# Patient Record
Sex: MALE | Race: WHITE | Employment: FULL TIME | URBAN - METROPOLITAN AREA
[De-identification: names, ages, dates, MRNs, and addresses within clinical notes are randomized per-mention and may not be internally consistent; named-entity substitution may affect disease eponyms.]

---

## 2017-10-05 ENCOUNTER — OFFICE VISIT (OUTPATIENT)
Dept: ORTHOPEDIC SURGERY | Age: 42
End: 2017-10-05

## 2017-10-05 VITALS
SYSTOLIC BLOOD PRESSURE: 106 MMHG | HEART RATE: 72 BPM | DIASTOLIC BLOOD PRESSURE: 73 MMHG | WEIGHT: 220 LBS | BODY MASS INDEX: 33.34 KG/M2 | HEIGHT: 68 IN

## 2017-10-05 DIAGNOSIS — S86.111A GASTROCNEMIUS STRAIN, RIGHT, INITIAL ENCOUNTER: ICD-10-CM

## 2017-10-05 DIAGNOSIS — R52 PAIN: Primary | ICD-10-CM

## 2017-10-05 PROCEDURE — 99203 OFFICE O/P NEW LOW 30 MIN: CPT | Performed by: ORTHOPAEDIC SURGERY

## 2017-10-05 PROCEDURE — 73590 X-RAY EXAM OF LOWER LEG: CPT | Performed by: ORTHOPAEDIC SURGERY

## 2017-10-05 NOTE — MR AVS SNAPSHOT
Learn more at: [email or web address]          Instructions    Patient was provided with instructions regarding their diagnosis and treatment today. They voiced understanding of the treatment plan and were instructed on when to return to the clinic. Medications and Orders      Allergies           No Known Allergies      We Ordered/Performed the following           XR TIBIA FIBULA RIGHT (2 VIEWS)     Comments:    [id number]         Result Summary for XR TIBIA FIBULA RIGHT (2 VIEWS)      Result Information     Status          Final result (Exam End: 10/5/2017  9:08 AM)           10/5/2017  9:08 AM      Narrative & Impression           Radiology result is complete; follow up with provider / physician office for radiology results                       Additional Information        Basic Information     Date Of Birth Sex Race Preferred Language       1975 Male Unavailable English       Problem List as of 10/5/2017  Date Reviewed: 10/5/2017          None      Preventive Care        Date Due    HIV screening is recommended for all people regardless of risk factors  aged 15-65 years at least once (lifetime) who have never been HIV tested. 11/15/1988    Tetanus Combination Vaccine (1 - Tdap) 11/15/1992    Cholesterol Screening 11/15/2013    Diabetes Screening 11/15/2013    Yearly Flu Vaccine (1) 9/1/2017            [employer]OIt Signup           [employer] allows you to send messages to your doctor, view your test results, renew your prescriptions, schedule appointments, view visit notes, and more. How Do I Sign Up? 1. In your Internet browser, go to [email or web address]  2. Click on the Sign Up Now link in the Sign In box. You will see the New Member Sign Up page. 3. Enter your [employer] Access Code exactly as it appears below. You will not need to use this code after youve completed the sign-up process.  If you do not sign up before the expiration date, you must request a new code. [employer] Access Code: [email or web address]  Expires: 12/4/2017  9:30 AM    4. Enter your Social Security Number ([id number]) and Date of Birth (mm/dd/yyyy) as indicated and click Submit. You will be taken to the next sign-up page. 5. Create a [employer] ID. This will be your [employer] login ID and cannot be changed, so think of one that is secure and easy to remember. 6. Create a [employer] password. You can change your password at any time. 7. Enter your Password Reset Question and Answer. This can be used at a later time if you forget your password. 8. Enter your e-mail address. You will receive e-mail notification when new information is available in [address] 9. Click Sign Up. You can now view your medical record. Additional Information  If you have questions, please contact the physician practice where you receive care. Remember, [employer] is NOT to be used for urgent needs. For medical emergencies, dial 911. For questions regarding your [employer] account call [phone]. If you have a clinical question, please call your doctor's office.

## 2017-10-05 NOTE — PATIENT INSTRUCTIONS
Patient was provided with instructions regarding their diagnosis and treatment today. They voiced understanding of the treatment plan and were instructed on when to return to the clinic.

## 2017-10-05 NOTE — PROGRESS NOTES
CHIEF COMPLAINT:    Chief Complaint   Patient presents with    Leg Pain     right calf pain. PT states was trying to jump up wall on tuesday felt a pop in calf. HISTORY OF PRESENT ILLNESS:                The patient is a 35 y.o. male who presents to clinic for evaluation of right calf pain. He states he felt a pop in this When pushing off of his foot. He was at his sides martial arts class when he decides to run up a wall without stretching. He has since had pain with ambulating. His wife had a Lachman. He has very of this which has helped somewhat. He points to the posterior aspect of the right lower leg as the source of his pain. No past medical history on file. The pain assessment was noted & is as follows:  Pain Assessment  Location of Pain: Leg  Location Modifiers: Right  Severity of Pain: 7  Quality of Pain: Aching, Sharp  Duration of Pain: Persistent  Frequency of Pain: Intermittent  Aggravating Factors: Bending, Stretching, Walking  Limiting Behavior: Some  Relieving Factors: Rest, Ice  Result of Injury: Yes  Work-Related Injury: No  Are there other pain locations you wish to document?: No]      Work Status/Functionality:     Past Medical History: Medical history form was reviewed today & can be found in the media tab  No past medical history on file. Past Surgical History:     No past surgical history on file. Current Medications:   No current outpatient prescriptions on file. Allergies:  Review of patient's allergies indicates no known allergies. Social History:    reports that he has never smoked. He does not have any smokeless tobacco history on file. Family History:   No family history on file. REVIEW OF SYSTEMS:   For new problems, a full review of systems will be found scanned in the patient's chart.   CONSTITUTIONAL: Denies unexplained weight loss, fevers, chills   NEUROLOGICAL: Denies unsteady gait or progressive weakness  SKIN: Denies skin changes, delayed healing, rash, itching       PHYSICAL EXAM:    Vitals: Blood pressure 106/73, pulse 72, height 5' 8\" (1.727 m), weight 220 lb (99.8 kg). GENERAL EXAM:  · General Apparence: Patient is adequately groomed with no evidence of malnutrition. · Orientation: The patient is oriented to time, place and person. · Mood & Affect:The patient's mood and affect are appropriate       Right calf PHYSICAL EXAMINATION:  · Inspection:  Slight swelling is present but there is no ecchymosis or erythema at this time    · Palpation:  He is tender along the midline of the lower leg posteriorly at the area of the plantaris and medial gastroc. There is no significant tenderness of the distal Achilles tendon    · Range of Motion: He can dorsiflex to neutral today but with discomfort. Plantarflexion is tolerated    · Strength: Weakness with resisted plantar flexion secondary to pain    · Special Tests: Negative Quesada test compared to the contralateral side            · Skin:  There are no rashes, ulcerations or lesions. · Gait & station: Antalgic favoring the right lower extremity      · Additional Examinations:        Left Lower Extremity: Examination of the left lower extremity does not show any tenderness, deformity or injury. Range of motion is unremarkable. There is no gross instability. There are no rashes, ulcerations or lesions. Strength and tone are normal.      Diagnostic Testing:      Views:  2   Location:  Right tib-fib   Findings:  No acute bony abnormalities of the tib-fib. He does have significant ossification of the proximal tibia at the tibial tubercle    Orders     Orders Placed This Encounter   Procedures    XR TIBIA FIBULA RIGHT (2 VIEWS)     [id number]     Order Specific Question:   Reason for exam:     Answer:   Pain         Assessment / Treatment Plan:     1. Medial gastroc/plantaris strain    The patient is going to continue to utilize his wife's walking boot.   I recommended follow-up in 2-3 weeks for repeat

## 2019-06-24 ENCOUNTER — OFFICE VISIT (OUTPATIENT)
Dept: ORTHOPEDIC SURGERY | Age: 44
End: 2019-06-24
Payer: COMMERCIAL

## 2019-06-24 VITALS — BODY MASS INDEX: 33.04 KG/M2 | HEIGHT: 68 IN | WEIGHT: 218 LBS

## 2019-06-24 DIAGNOSIS — M16.12 PRIMARY OSTEOARTHRITIS OF LEFT HIP: ICD-10-CM

## 2019-06-24 DIAGNOSIS — M25.552 LEFT HIP PAIN: Primary | ICD-10-CM

## 2019-06-24 PROCEDURE — 99214 OFFICE O/P EST MOD 30 MIN: CPT | Performed by: ORTHOPAEDIC SURGERY

## 2019-06-24 RX ORDER — MELOXICAM 15 MG/1
15 TABLET ORAL DAILY
Qty: 90 TABLET | Refills: 1 | Status: SHIPPED | OUTPATIENT
Start: 2019-06-24 | End: 2020-05-18

## 2019-06-24 NOTE — PROGRESS NOTES
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 HISTORY OF PRESENT ILLNESS:                The patient is a 37 y.o. male who presents to clinic for evaluation of left hip pain. He states he started noticing pain in this hip about a year ago which was significantly worsened last week when bowling. He denies any distinct injury however, did aggravate it while playing tennis as well. He works as a  and an . History reviewed. No pertinent past medical history. The pain assessment was noted & is as follows:  Pain Assessment  Location of Pain: Pelvis  Location Modifiers: Left  Severity of Pain: 5  Quality of Pain: Aching  Duration of Pain: Persistent  Frequency of Pain: Constant]      Work Status/Functionality:     Past Medical History: Medical history form was reviewed today & can be found in the media tab  History reviewed. No pertinent past medical history. Past Surgical History:     History reviewed. No pertinent surgical history. Current Medications:   No current outpatient medications on file. Allergies:  Patient has no known allergies. Social History:    reports that he has never smoked. He has never used smokeless tobacco.  Family History:   History reviewed. No pertinent family history. REVIEW OF SYSTEMS:   For new problems, a full review of systems will be found scanned in the patient's chart. CONSTITUTIONAL: Denies unexplained weight loss, fevers, chills   NEUROLOGICAL: Denies unsteady gait or progressive weakness  SKIN: Denies skin changes, delayed healing, rash, itching       PHYSICAL EXAM:    Vitals: Height 5' 7.99\" (1.727 m), weight 218 lb (98.9 kg). GENERAL EXAM:  · General Apparence: Patient is adequately groomed with no evidence of malnutrition. · Orientation: The patient is oriented to time, place and person. · Mood & Affect: The patient's mood and affect are appropriate       Left hip PHYSICAL EXAMINATION:  · Inspection: No visible deformity. No significant edema, erythema or ecchymosis. · Palpation: Tenderness to palpation of the groin without palpable mass    · Range of Motion: Range of motion of the hip is limited with internal and external rotation    · Strength: No gross strength deficits are noted    · Special Tests: No pain with gentle logroll testing. Negative Homans testing. · Skin:  There are no rashes, ulcerations or lesions. · There are no dysvascular changes     Gait & station: Normal gait today      Additional Examinations:        Right Lower Extremity: Examination of the right lower extremity does not show any tenderness, deformity or injury. Range of motion is unremarkable. There is no gross instability. There are no rashes, ulcerations or lesions. Strength and tone are normal.      Diagnostic Testing: The following x rays were read and interpreted by myself      1.  2 x-rays of the left hip were taken today and reveal significant osteoarthritis with cam type impingement, significant joint space narrowing and osteophyte formation    Orders     Orders Placed This Encounter   Procedures    XR HIP LEFT (2-3 VIEWS)     Standing Status:   Future     Number of Occurrences:   1     Standing Expiration Date:   7/24/2019         Assessment / Treatment Plan:     1. Left hip osteoarthritis, severe    We had a lengthy discussion regarding the severity of his osteoarthritis and the chronic nature of this diagnosis. We discussed activity modification including low impact activities such as biking and swimming. We also discussed the progressive nature of his injury and likely joint replacement in the future. At this time, we are going to prescribe him meloxicam to help with his daily pain and stiffness. He will return to the clinic for future treatment as his symptoms progress.      I spent 25 minutes face-to-face with the patient and greater than 50% of that time was spent counseling/coordinating care for the above-stated diagnosis    I have personally performed and/or participated in the history, exam and medical decision making and agree with all pertinent clinical information. I have also reviewed and agree with the past medical, family and social history unless otherwise noted. This dictation was performed with a verbal recognition program (DRAGON) and it was checked for errors. It is possible that there are still dictated errors within this office note. If so, please bring any errors to my attention for an addendum. All efforts were made to ensure that this office note is accurate.           Alejandra Jiang MD

## 2019-06-24 NOTE — PATIENT INSTRUCTIONS
Patient Education        Hip Arthritis: Exercises  Your Care Instructions  Here are some examples of exercises for hip arthritis. Start each exercise slowly. Ease off the exercise if you start to have pain. Your doctor or physical therapist will tell you when you can start these exercises and which ones will work best for you. How to do the exercises  Straight-leg raises to the outside    1. Lie on your side, with your affected hip on top. 2. Tighten the front thigh muscles of your top leg to keep your knee straight. 3. Keep your hip and your leg straight in line with the rest of your body, and keep your knee pointing forward. Do not drop your hip back. 4. Lift your top leg straight up toward the ceiling, about 12 inches off the floor. Hold for about 6 seconds, then slowly lower your leg. 5. Repeat 8 to 12 times. 6. Switch legs and repeat steps 1 through 5, even if only one hip is sore. Straight-leg raises to the inside    1. Lie on your side with your affected hip on the floor. 2. You can either prop up your other leg on a chair, or you can bend that knee and put that foot in front of your other knee. Do not drop your hip back. 3. Tighten the muscles on the front thigh of your bottom leg to straighten that knee. 4. Keep your kneecap pointing forward and your leg straight, and lift your bottom leg up toward the ceiling about 6 inches. Hold for about 6 seconds, then lower slowly. 5. Repeat 8 to 12 times. 6. Switch legs and repeat steps 1 through 5, even if only one hip is sore. Hip hike    1. Stand sideways on the bottom step of a staircase, and hold on to the banister or wall. 2. Keeping both knees straight, lift your good leg off the step and let it hang down. Then hike your good hip up to the same level as your affected hip or a little higher. 3. Repeat 8 to 12 times. 4. Switch legs and repeat steps 1 through 3, even if only one hip is sore. Bridging    1.  Lie on your back with both knees stretch for 15 to 30 seconds. 5. Repeat 2 to 4 times. 6. Repeat steps 1 through 5, but this time use your hand to gently pull your knee toward your opposite shoulder. 7. Switch legs and repeat steps 1 through 6, even if only one hip is sore. Knee-to-chest    1. Lie on your back with your knees bent and your feet flat on the floor. 2. Bring your affected leg to your chest, keeping the other foot flat on the floor (or keeping the other leg straight, whichever feels better on your lower back). 3. Keep your lower back pressed to the floor. Hold for at least 15 to 30 seconds. 4. Relax, and lower the knee to the starting position. 5. Repeat 2 to 4 times. 6. Switch legs and repeat steps 1 through 5, even if only one hip is sore. 7. To get more stretch, put your other leg flat on the floor while pulling your knee to your chest.    Clamshell    1. Lie on your side, with your affected hip on top. Keep your feet and knees together and your knees bent. 2. Raise your top knee, but keep your feet together. Do not let your hips roll back. Your legs should open up like a clamshell. 3. Hold for 6 seconds. 4. Slowly lower your knee back down. Rest for 10 seconds. 5. Repeat 8 to 12 times. 6. Switch legs and repeat steps 1 through 5, even if only one hip is sore. Follow-up care is a key part of your treatment and safety. Be sure to make and go to all appointments, and call your doctor if you are having problems. It's also a good idea to know your test results and keep a list of the medicines you take. Where can you learn more? Go to [email or web address] and sign in to your [employer] account. Enter G814 in the Codota box to learn more about \"Hip Arthritis: Exercises. \"     If you do not have an account, please click on the \"Sign Up Now\" link. Current as of: September 20, 2018  Content Version: 12.0  © 7394-8033 Healthwise, Incorporated.  Care instructions adapted under license by

## 2020-05-18 RX ORDER — MELOXICAM 15 MG/1
TABLET ORAL
Qty: 30 TABLET | Refills: 0 | Status: SHIPPED | OUTPATIENT
Start: 2020-05-18 | End: 2020-07-06 | Stop reason: SDUPTHER

## 2020-07-06 RX ORDER — MELOXICAM 15 MG/1
15 TABLET ORAL DAILY
Qty: 90 TABLET | Refills: 2 | Status: ON HOLD | OUTPATIENT
Start: 2020-07-06 | End: 2020-10-20 | Stop reason: HOSPADM

## 2020-08-13 ENCOUNTER — OFFICE VISIT (OUTPATIENT)
Dept: ORTHOPEDIC SURGERY | Age: 45
End: 2020-08-13
Payer: COMMERCIAL

## 2020-08-13 VITALS — WEIGHT: 218 LBS | HEIGHT: 68 IN | BODY MASS INDEX: 33.04 KG/M2

## 2020-08-13 PROCEDURE — 99214 OFFICE O/P EST MOD 30 MIN: CPT | Performed by: PHYSICIAN ASSISTANT

## 2020-08-13 NOTE — PROGRESS NOTES
CHIEF COMPLAINT:    Chief Complaint   Patient presents with    Hip Pain     CK LEFT HIP       HISTORY OF PRESENT ILLNESS:                The patient is a 53 y.o. male returns to clinic today with continued left hip pain. He is an established patient with known, severe osteoarthritis of the left hip. He is very active and coaches tennis. He is very active in cycling as well. He states that his hip pain is continuing to worsen and is significantly limiting his quality of life. He struggles with very basic activities such as getting in and out of the car or sleeping at night. No past medical history on file. Work Status: He is a     The pain assessment was noted & is as follows:  Pain Assessment  Location of Pain: Pelvis  Location Modifiers: Left  Severity of Pain: 5  Quality of Pain: Dull, Aching, Sharp  Duration of Pain: A few hours  Frequency of Pain: Several times daily  Aggravating Factors: Stretching, Bending, Walking, Stairs  Limiting Behavior: Some  Relieving Factors: Rest  Result of Injury: No  Work-Related Injury: No  Are there other pain locations you wish to document?: No]      Work Status/Functionality:     Past Medical History: Medical history form was reviewed today & can be found in the media tab  No past medical history on file. Past Surgical History:     No past surgical history on file. Current Medications:     Current Outpatient Medications:     meloxicam (MOBIC) 15 MG tablet, Take 1 tablet by mouth daily, Disp: 90 tablet, Rfl: 2  Allergies:  Patient has no known allergies. Social History:    reports that he has never smoked. He has never used smokeless tobacco.  Family History:   No family history on file. REVIEW OF SYSTEMS:   For new problems, a full review of systems will be found scanned in the patient's chart.   CONSTITUTIONAL: Denies unexplained weight loss, fevers, chills   NEUROLOGICAL: Denies unsteady gait or progressive weakness  SKIN: Denies skin changes, delayed healing, rash, itching       PHYSICAL EXAM:    Vitals: Height 5' 7.99\" (1.727 m), weight 218 lb (98.9 kg). GENERAL EXAM:  · General Apparence: Patient is adequately groomed with no evidence of malnutrition. · Orientation: The patient is oriented to time, place and person. · Mood & Affect:The patient's mood and affect are appropriate       Left hip PHYSICAL EXAMINATION:  · Inspection: No visible deformity. No significant edema, erythema or ecchymosis. · Palpation: Tenderness to palpation of the groin without palpable mass    · Range of Motion: External rotation is limited to approximately 45 degrees. Internal rotation is virtually nonexistent. · Strength: Significant strength deficits are noted    · Special Tests: Neurovascular exam is intact the distal extremity. Negative Homans testing. · Skin:  There are no rashes, ulcerations or lesions. · There are no dysvascular changes     Gait & station: Mildly antalgic gait      Additional Examinations:        Right Lower Extremity: Examination of the right lower extremity does not show any tenderness, deformity or injury. Range of motion is unremarkable. There is no gross instability. There are no rashes, ulcerations or lesions. Strength and tone are normal.      Diagnostic Testing: The following x rays were read and interpreted by myself      1.  2 x-rays of the left hip were taken today and reveal absolutely end-stage bone-on-bone osteoarthritis with significant osteophyte formation and cam type impingement    Orders     Orders Placed This Encounter   Procedures    XR HIP LEFT (2-3 VIEWS)     Standing Status:   Future     Number of Occurrences:   1     Standing Expiration Date:   8/13/2021     Order Specific Question:   Reason for exam:     Answer:   PAIN         Assessment / Treatment Plan:     1.   Left hip osteoarthritis, severe    We discussed the severity of his osteoarthritis and given his very active lifestyle in limitations he is having, he is interested in moving forward with total hip replacement. Perform a left total hip replacement using [employer] system. We will utilize Oxinium cross-link polyethylene. We discussed the risk, benefits and complications of total hip replacement arthroplasty surgery. We specifically discussed concerns including infection, deep vein thrombosis, neurological injury, and specifically sciatic nerve injury with the possibility of footdrop or other neurological compromise. We further discussed the possibility of dislocation of the prosthesis and the precautions that are involved after total hip replacement surgery to try to avoid dislocation. We also discussed the reality of the rehabilitation process. We discussed the rehabilitation involved with total hip replacement surgery, possible inpatient rehabilitation facility placement versus going home with home physical therapy. We also talked about visiting with [address] [place] [address] postoperative physical therapy to regain range of motion and strength after the surgery. All questions were answered. The appropriate literature was given to the patient. I spent 25 minutes face-to-face with the patient and greater than 50% of that time was spent counseling/coordinating care for the above-stated diagnosis       Franca Aquino, [facility]    This dictation was performed with a verbal recognition program (DRAGON) and it was checked for errors. It is possible that there are still dictated errors within this office note. If so, please bring any errors to my attention for an addendum. All efforts were made to ensure that this office note is accurate.

## 2020-09-04 ENCOUNTER — TELEPHONE (OUTPATIENT)
Dept: ORTHOPEDIC SURGERY | Age: 45
End: 2020-09-04

## 2020-09-04 NOTE — TELEPHONE ENCOUNTER
Auth: NPR  Date: 10/20/20  Spoke with: NKECHI  Type of SX: OUTPATIENT  Location: [facility]  CPT [id number]   SX area:  HIP  Insurance: [email or web address]

## 2020-10-01 ENCOUNTER — TELEPHONE (OUTPATIENT)
Dept: ORTHOPEDIC SURGERY | Age: 45
End: 2020-10-01

## 2020-10-01 NOTE — TELEPHONE ENCOUNTER
COVID:10/16/2020- Negative     Orthopedic Nurse Navigator Summary  -  Patient Name: Jill Celestin  Anticipated Date of Surgery:10/20/2020  Using [employer]? Yes, Are they Registered: Yes  Attended Pre-Op Education Class: No  If No, why not? PCP:  Phone #:  Date of PCP Visit for H&P: 10/09/2020  Any Noted Concerns from PCP prior to surgery: No  If Yes, what concerns?:  Is the Patient in a Pain Management Program?: No  Review of Past Medical History Reveals History of:  -  Critical Lab Values:  - Hemoglobin (g/dL): Date Value  - Hematocrit (%): Date Value  - HgbA1C : Date Value  - Albumin : Date Value  - BUN (mg/dL) : Date Value  - CREATININE (mg/dL) : Date Value  - BMI (kg/m2) : Date Value  -  Coronary Artery Disease/HTN/CHF History: No  Cardiologist:  Cardiac Clearance Necessary: No  Date of Cardiac Clearance Appt: On Plavix? No  If YES, when will they stop taking? Final Cardiac Recommendations: On any anticoagulation: No  -  Diabetes History: No  Most Recent HgbA1C:  PCP or Endocrine Recommendations:  Nutritionist/Dietician Consult Scheduled:  Final Plan For Diabetic Control:  Pulmonary: COPD/Emphysema/ Use of home oxygen: NONE  Alcohol use: Non-Drinker  -  DVT Risk Stratification: Low Risk  Vascular Consult Ordered:  Date of Vascular Appt:  Hematology/Oncology Consult Ordered:  Date of Hematology/Oncology Appt:  Final Recommendation For DVT Prophylaxis:  Smoking history: Non-Smoker  Use of Estrogen:  -  BMI Greater than 40 at time of scheduling?: No  Has Surgeon been notified of BMI concern? No  Weight Loss Clinic Consult Ordered No  Date of Wt Loss Clinic Appt:  BMI at time of surgery (if went through [facility]):  -  Additional Medical Concerns: Additional Recommendations for above concerns:  Attended Pre-Hab Program: No  Anticipated Discharge Disposition: D/C home  Who will be with patient at home following discharge?  wife  Equipment patient already has: Юлия Arenas on first or second floor: first  Bathroom on first or second floor: first  Weight bearing status: full  Pre-op ambulatory status:  Number of entry steps: FIVE  Caregiver assistance: full time  Tej Slade [facility]  10/06/2020

## 2020-10-09 ENCOUNTER — HOSPITAL ENCOUNTER (OUTPATIENT)
Dept: PREADMISSION TESTING | Age: 45
Discharge: HOME | End: 2020-10-13
Payer: COMMERCIAL

## 2020-10-09 LAB
ABO/RH: NORMAL
ALBUMIN SERPL-MCNC: 4.2 G/DL (ref 3.4–5)
ANION GAP SERPL CALCULATED.3IONS-SCNC: 10 MMOL/L (ref 3–16)
ANTIBODY SCREEN: NORMAL
APTT: 30.7 SEC (ref 24.2–36.2)
BASOPHILS ABSOLUTE: 0.1 K/UL (ref 0–0.2)
BASOPHILS RELATIVE PERCENT: 0.7 %
BILIRUBIN URINE: NEGATIVE
BLOOD, URINE: NEGATIVE
BUN BLDV-MCNC: 19 MG/DL (ref 7–20)
CALCIUM SERPL-MCNC: 9.7 MG/DL (ref 8.3–10.6)
CHLORIDE BLD-SCNC: 100 MMOL/L (ref 99–110)
CLARITY: CLEAR
CO2: 28 MMOL/L (ref 21–32)
COLOR: YELLOW
CREAT SERPL-MCNC: 1 MG/DL (ref 0.9–1.3)
EKG ATRIAL RATE: 65 BPM
EKG DIAGNOSIS: NORMAL
EKG P AXIS: 51 DEGREES
EKG P-R INTERVAL: 218 MS
EKG Q-T INTERVAL: 398 MS
EKG QRS DURATION: 88 MS
EKG QTC CALCULATION (BAZETT): 413 MS
EKG R AXIS: -13 DEGREES
EKG T AXIS: 6 DEGREES
EKG VENTRICULAR RATE: 65 BPM
EOSINOPHILS ABSOLUTE: 0.2 K/UL (ref 0–0.6)
EOSINOPHILS RELATIVE PERCENT: 1.9 %
GFR AFRICAN AMERICAN: >60
GFR NON-AFRICAN AMERICAN: >60
GLUCOSE BLD-MCNC: 81 MG/DL (ref 70–99)
GLUCOSE URINE: NEGATIVE MG/DL
HCT VFR BLD CALC: 45 % (ref 40.5–52.5)
HEMOGLOBIN: 15.1 G/DL (ref 13.5–17.5)
INR BLD: 0.94 (ref 0.86–1.14)
KETONES, URINE: NEGATIVE MG/DL
LEUKOCYTE ESTERASE, URINE: NEGATIVE
LYMPHOCYTES ABSOLUTE: 3.7 K/UL (ref 1–5.1)
LYMPHOCYTES RELATIVE PERCENT: 45.2 %
MCH RBC QN AUTO: 29.1 PG (ref 26–34)
MCHC RBC AUTO-ENTMCNC: 33.6 G/DL (ref 31–36)
MCV RBC AUTO: 86.6 FL (ref 80–100)
MICROSCOPIC EXAMINATION: NORMAL
MONOCYTES ABSOLUTE: 0.7 K/UL (ref 0–1.3)
MONOCYTES RELATIVE PERCENT: 8.2 %
NEUTROPHILS ABSOLUTE: 3.6 K/UL (ref 1.7–7.7)
NEUTROPHILS RELATIVE PERCENT: 44 %
NITRITE, URINE: NEGATIVE
PDW BLD-RTO: 12.9 % (ref 12.4–15.4)
PH UA: 7 (ref 5–8)
PLATELET # BLD: 288 K/UL (ref 135–450)
PMV BLD AUTO: 7.9 FL (ref 5–10.5)
POTASSIUM SERPL-SCNC: 4.1 MMOL/L (ref 3.5–5.1)
PROTEIN UA: NEGATIVE MG/DL
PROTHROMBIN TIME: 10.9 SEC (ref 10–13.2)
RBC # BLD: 5.2 M/UL (ref 4.2–5.9)
SODIUM BLD-SCNC: 138 MMOL/L (ref 136–145)
SPECIFIC GRAVITY UA: 1.02 (ref 1–1.03)
TRANSFERRIN: 268 MG/DL (ref 200–360)
URINE TYPE: NORMAL
UROBILINOGEN, URINE: 0.2 E.U./DL
WBC # BLD: 8.1 K/UL (ref 4–11)

## 2020-10-09 PROCEDURE — 86900 BLOOD TYPING SEROLOGIC ABO: CPT

## 2020-10-09 PROCEDURE — 82040 ASSAY OF SERUM ALBUMIN: CPT

## 2020-10-09 PROCEDURE — 86850 RBC ANTIBODY SCREEN: CPT

## 2020-10-09 PROCEDURE — 85025 COMPLETE CBC W/AUTO DIFF WBC: CPT

## 2020-10-09 PROCEDURE — 81003 URINALYSIS AUTO W/O SCOPE: CPT

## 2020-10-09 PROCEDURE — 93005 ELECTROCARDIOGRAM TRACING: CPT

## 2020-10-09 PROCEDURE — 85610 PROTHROMBIN TIME: CPT

## 2020-10-09 PROCEDURE — 86901 BLOOD TYPING SEROLOGIC RH(D): CPT

## 2020-10-09 PROCEDURE — 84466 ASSAY OF TRANSFERRIN: CPT

## 2020-10-09 PROCEDURE — 36415 COLL VENOUS BLD VENIPUNCTURE: CPT

## 2020-10-09 PROCEDURE — 87086 URINE CULTURE/COLONY COUNT: CPT

## 2020-10-09 PROCEDURE — 83036 HEMOGLOBIN GLYCOSYLATED A1C: CPT

## 2020-10-09 PROCEDURE — 80048 BASIC METABOLIC PNL TOTAL CA: CPT

## 2020-10-09 PROCEDURE — 85730 THROMBOPLASTIN TIME PARTIAL: CPT

## 2020-10-10 LAB
ESTIMATED AVERAGE GLUCOSE: 108.3 MG/DL
HBA1C MFR BLD: 5.4 %
URINE CULTURE, ROUTINE: NORMAL

## 2020-10-12 ENCOUNTER — ANESTHESIA EVENT (OUTPATIENT)
Dept: OPERATING ROOM | Age: 45
End: 2020-10-12
Payer: COMMERCIAL

## 2020-10-12 SDOH — HEALTH STABILITY: MENTAL HEALTH: HOW OFTEN DO YOU HAVE A DRINK CONTAINING ALCOHOL?: NEVER

## 2020-10-12 NOTE — PROGRESS NOTES
Obstructive Sleep Apnea (SHERIE) Screening     Patient:  Joyce Singh    YOB: 1975      Medical Record #:  [id number]                     Date:  10/12/2020     1. Are you a loud and/or regular snorer? [x]  Yes       [] No    2. Have you been observed to gasp or stop breathing during sleep? []  Yes       [x] No    3. Do you feel tired or groggy upon awakening or do you awaken with a headache?           []  Yes       [x] No    4. Are you often tired or fatigued during the wake time hours? []  Yes       [] No    5. Do you fall asleep sitting, reading, watching TV or driving? []  Yes       [] No    6. Do you often have problems with memory or concentration? []  Yes       [] No    **If patient's score is ? 3 they are considered high risk for SHERIE. An Anesthesia provider will evaluate the patient and develop a plan of care the day of surgery. Note:  If the patient's BMI is more than 35 kg m¯² , has neck circumference > 40 cm, and/or high blood pressure the risk is greater (© [employer], 2006).

## 2020-10-12 NOTE — PROGRESS NOTES
Preoperative Screening for Elective Surgery/Invasive Procedures While COVID-19 present in the community     Have you had any of the following symptoms? o Fever, chills  o Cough  o Shortness of breath  o Muscle aches/pain  o Diarrhea  o Abdominal pain, nausea, vomiting  o Loss or decrease in taste and / or smell   Risk of Exposure  o Have you recently been hospitalized for COVID-19 or flu-like illness, if so when?  o Recently diagnosed with COVID-19, if so when?  o Recently tested for COVID-19, if so when?  o Have you been in close contact with a person or family member who currently has or recently had COVID-19? If yes, when and in what context?  o Do you live with anybody who in the last 14 days has had fever, chills, shortness of breath, muscle aches, flu-like illness?  o Do you have any close contacts or family members who are currently in the hospital for COVID-19 or flu-like illness? If yes, assess recent close contact with this person. Indicate if the patient has a positive screen by answering yes to one or more of the above questions. Patients who test positive or screen positive prior to surgery or on the day of surgery should be evaluated in conjunction with the surgeon/proceduralist/anesthesiologist to determine the urgency of the procedure. No to all questions.  Covid test 10/16

## 2020-10-14 ENCOUNTER — TELEPHONE (OUTPATIENT)
Dept: ORTHOPEDIC SURGERY | Age: 45
End: 2020-10-14

## 2020-10-16 ENCOUNTER — OFFICE VISIT (OUTPATIENT)
Dept: PRIMARY CARE CLINIC | Age: 45
End: 2020-10-16
Payer: COMMERCIAL

## 2020-10-16 PROCEDURE — 99211 OFF/OP EST MAY X REQ PHY/QHP: CPT | Performed by: NURSE PRACTITIONER

## 2020-10-16 NOTE — PATIENT INSTRUCTIONS

## 2020-10-16 NOTE — PROGRESS NOTES
Mya Gabriel received a viral test for COVID-19. They were educated on isolation and quarantine as appropriate. For any symptoms, they were directed to seek care from their PCP, given contact information to establish with a doctor, directed to an urgent care or the emergency room.

## 2020-10-17 LAB — SARS-COV-2, NAA: NOT DETECTED

## 2020-10-19 NOTE — RESULT ENCOUNTER NOTE

## 2020-10-20 ENCOUNTER — HOSPITAL ENCOUNTER (OUTPATIENT)
Age: 45
Discharge: HOME | End: 2020-10-21
Attending: ORTHOPAEDIC SURGERY | Admitting: ORTHOPAEDIC SURGERY
Payer: COMMERCIAL

## 2020-10-20 ENCOUNTER — APPOINTMENT (OUTPATIENT)
Dept: GENERAL RADIOLOGY | Age: 45
End: 2020-10-20
Attending: ORTHOPAEDIC SURGERY
Payer: COMMERCIAL

## 2020-10-20 ENCOUNTER — ANESTHESIA (OUTPATIENT)
Dept: OPERATING ROOM | Age: 45
End: 2020-10-20
Payer: COMMERCIAL

## 2020-10-20 VITALS
RESPIRATION RATE: 6 BRPM | SYSTOLIC BLOOD PRESSURE: 91 MMHG | TEMPERATURE: 98.6 F | DIASTOLIC BLOOD PRESSURE: 53 MMHG | OXYGEN SATURATION: 100 %

## 2020-10-20 PROBLEM — Z96.642 STATUS POST TOTAL HIP REPLACEMENT, LEFT: Status: ACTIVE | Noted: 2020-10-20

## 2020-10-20 PROBLEM — Z96.642 STATUS POST TOTAL REPLACEMENT OF LEFT HIP: Status: ACTIVE | Noted: 2020-10-20

## 2020-10-20 LAB
ABO/RH: NORMAL
ANTIBODY SCREEN: NORMAL

## 2020-10-20 PROCEDURE — 3600000015 HC SURGERY LEVEL 5 ADDTL 15MIN: Performed by: ORTHOPAEDIC SURGERY

## 2020-10-20 PROCEDURE — 86850 RBC ANTIBODY SCREEN: CPT

## 2020-10-20 PROCEDURE — 2500000003 HC RX 250 WO HCPCS: Performed by: ANESTHESIOLOGY

## 2020-10-20 PROCEDURE — 97530 THERAPEUTIC ACTIVITIES: CPT

## 2020-10-20 PROCEDURE — 6360000002 HC RX W HCPCS: Performed by: PHYSICIAN ASSISTANT

## 2020-10-20 PROCEDURE — 97110 THERAPEUTIC EXERCISES: CPT

## 2020-10-20 PROCEDURE — 97165 OT EVAL LOW COMPLEX 30 MIN: CPT

## 2020-10-20 PROCEDURE — 6360000002 HC RX W HCPCS: Performed by: ANESTHESIOLOGY

## 2020-10-20 PROCEDURE — 2500000003 HC RX 250 WO HCPCS: Performed by: ORTHOPAEDIC SURGERY

## 2020-10-20 PROCEDURE — 7100000000 HC PACU RECOVERY - FIRST 15 MIN: Performed by: ORTHOPAEDIC SURGERY

## 2020-10-20 PROCEDURE — 88304 TISSUE EXAM BY PATHOLOGIST: CPT

## 2020-10-20 PROCEDURE — 2709999900 HC NON-CHARGEABLE SUPPLY: Performed by: ORTHOPAEDIC SURGERY

## 2020-10-20 PROCEDURE — C1713 ANCHOR/SCREW BN/BN,TIS/BN: HCPCS | Performed by: ORTHOPAEDIC SURGERY

## 2020-10-20 PROCEDURE — 3700000000 HC ANESTHESIA ATTENDED CARE: Performed by: ORTHOPAEDIC SURGERY

## 2020-10-20 PROCEDURE — 6370000000 HC RX 637 (ALT 250 FOR IP): Performed by: ANESTHESIOLOGY

## 2020-10-20 PROCEDURE — 2500000003 HC RX 250 WO HCPCS: Performed by: NURSE ANESTHETIST, CERTIFIED REGISTERED

## 2020-10-20 PROCEDURE — 2500000003 HC RX 250 WO HCPCS

## 2020-10-20 PROCEDURE — 6360000002 HC RX W HCPCS: Performed by: ORTHOPAEDIC SURGERY

## 2020-10-20 PROCEDURE — 76942 ECHO GUIDE FOR BIOPSY: CPT | Performed by: ANESTHESIOLOGY

## 2020-10-20 PROCEDURE — 97161 PT EVAL LOW COMPLEX 20 MIN: CPT

## 2020-10-20 PROCEDURE — 3600000005 HC SURGERY LEVEL 5 BASE: Performed by: ORTHOPAEDIC SURGERY

## 2020-10-20 PROCEDURE — 86901 BLOOD TYPING SEROLOGIC RH(D): CPT

## 2020-10-20 PROCEDURE — C1776 JOINT DEVICE (IMPLANTABLE): HCPCS | Performed by: ORTHOPAEDIC SURGERY

## 2020-10-20 PROCEDURE — 97116 GAIT TRAINING THERAPY: CPT

## 2020-10-20 PROCEDURE — 73501 X-RAY EXAM HIP UNI 1 VIEW: CPT

## 2020-10-20 PROCEDURE — 6360000002 HC RX W HCPCS: Performed by: NURSE ANESTHETIST, CERTIFIED REGISTERED

## 2020-10-20 PROCEDURE — 2580000003 HC RX 258: Performed by: ANESTHESIOLOGY

## 2020-10-20 PROCEDURE — 2580000003 HC RX 258: Performed by: ORTHOPAEDIC SURGERY

## 2020-10-20 PROCEDURE — 97535 SELF CARE MNGMENT TRAINING: CPT

## 2020-10-20 PROCEDURE — 86900 BLOOD TYPING SEROLOGIC ABO: CPT

## 2020-10-20 PROCEDURE — 7100000001 HC PACU RECOVERY - ADDTL 15 MIN: Performed by: ORTHOPAEDIC SURGERY

## 2020-10-20 PROCEDURE — 6370000000 HC RX 637 (ALT 250 FOR IP): Performed by: PHYSICIAN ASSISTANT

## 2020-10-20 PROCEDURE — 3700000001 HC ADD 15 MINUTES (ANESTHESIA): Performed by: ORTHOPAEDIC SURGERY

## 2020-10-20 PROCEDURE — C9290 INJ, BUPIVACAINE LIPOSOME: HCPCS | Performed by: ORTHOPAEDIC SURGERY

## 2020-10-20 PROCEDURE — 36592 COLLECT BLOOD FROM PICC: CPT

## 2020-10-20 PROCEDURE — 88311 DECALCIFY TISSUE: CPT

## 2020-10-20 DEVICE — R3 SCREW HOLE COVER
Type: IMPLANTABLE DEVICE | Site: HIP | Status: FUNCTIONAL
Brand: R3

## 2020-10-20 DEVICE — R3 20 DEGREE XLPE ACETABULAR LINER                                    36MM INNER DIAMETER X OUTER DIAMETER 52MM
Type: IMPLANTABLE DEVICE | Site: HIP | Status: FUNCTIONAL
Brand: R3

## 2020-10-20 DEVICE — REFLECTION THREADED HOLE COVER
Type: IMPLANTABLE DEVICE | Site: HIP | Status: FUNCTIONAL
Brand: REFLECTION

## 2020-10-20 DEVICE — R3 3 HOLE ACETABULAR SHELL 52MM
Type: IMPLANTABLE DEVICE | Site: HIP | Status: FUNCTIONAL
Brand: R3 ACETABULAR

## 2020-10-20 DEVICE — SYNERGY POROUS HIGH OFFSET FEMORAL                                    COMPONENT SZ 13
Type: IMPLANTABLE DEVICE | Site: HIP | Status: FUNCTIONAL
Brand: SYNERGY

## 2020-10-20 DEVICE — REFLECTION SPHERICAL HEAD SCREW 20MM
Type: IMPLANTABLE DEVICE | Site: HIP | Status: FUNCTIONAL
Brand: REFLECTION

## 2020-10-20 DEVICE — OXINIUM FEMORAL HEAD 12/14 TAPER                                    36 MM -3
Type: IMPLANTABLE DEVICE | Site: HIP | Status: FUNCTIONAL
Brand: OXINIUM

## 2020-10-20 RX ORDER — FENTANYL CITRATE 50 UG/ML
50 INJECTION, SOLUTION INTRAMUSCULAR; INTRAVENOUS EVERY 5 MIN PRN
Status: DISCONTINUED | OUTPATIENT
Start: 2020-10-20 | End: 2020-10-20 | Stop reason: HOSPADM

## 2020-10-20 RX ORDER — SODIUM CHLORIDE, SODIUM LACTATE, POTASSIUM CHLORIDE, CALCIUM CHLORIDE 600; 310; 30; 20 MG/100ML; MG/100ML; MG/100ML; MG/100ML
INJECTION, SOLUTION INTRAVENOUS CONTINUOUS
Status: DISCONTINUED | OUTPATIENT
Start: 2020-10-20 | End: 2020-10-20

## 2020-10-20 RX ORDER — PROPOFOL 10 MG/ML
INJECTION, EMULSION INTRAVENOUS PRN
Status: DISCONTINUED | OUTPATIENT
Start: 2020-10-20 | End: 2020-10-20 | Stop reason: SDUPTHER

## 2020-10-20 RX ORDER — PHENYLEPHRINE HCL IN 0.9% NACL 1 MG/10 ML
SYRINGE (ML) INTRAVENOUS PRN
Status: DISCONTINUED | OUTPATIENT
Start: 2020-10-20 | End: 2020-10-20 | Stop reason: SDUPTHER

## 2020-10-20 RX ORDER — OXYCODONE HYDROCHLORIDE 5 MG/1
5 TABLET ORAL PRN
Status: COMPLETED | OUTPATIENT
Start: 2020-10-20 | End: 2020-10-20

## 2020-10-20 RX ORDER — PROMETHAZINE HYDROCHLORIDE 25 MG/ML
6.25 INJECTION, SOLUTION INTRAMUSCULAR; INTRAVENOUS
Status: DISCONTINUED | OUTPATIENT
Start: 2020-10-20 | End: 2020-10-20 | Stop reason: HOSPADM

## 2020-10-20 RX ORDER — SODIUM CHLORIDE 0.9 % (FLUSH) 0.9 %
10 SYRINGE (ML) INJECTION PRN
Status: DISCONTINUED | OUTPATIENT
Start: 2020-10-20 | End: 2020-10-21 | Stop reason: HOSPADM

## 2020-10-20 RX ORDER — HYDRALAZINE HYDROCHLORIDE 20 MG/ML
5 INJECTION INTRAMUSCULAR; INTRAVENOUS EVERY 10 MIN PRN
Status: DISCONTINUED | OUTPATIENT
Start: 2020-10-20 | End: 2020-10-20 | Stop reason: HOSPADM

## 2020-10-20 RX ORDER — MEPERIDINE HYDROCHLORIDE 50 MG/ML
12.5 INJECTION INTRAMUSCULAR; INTRAVENOUS; SUBCUTANEOUS EVERY 5 MIN PRN
Status: DISCONTINUED | OUTPATIENT
Start: 2020-10-20 | End: 2020-10-20 | Stop reason: HOSPADM

## 2020-10-20 RX ORDER — CELECOXIB 100 MG/1
100 CAPSULE ORAL 2 TIMES DAILY
Status: DISCONTINUED | OUTPATIENT
Start: 2020-10-20 | End: 2020-10-20

## 2020-10-20 RX ORDER — ROCURONIUM BROMIDE 10 MG/ML
INJECTION, SOLUTION INTRAVENOUS PRN
Status: DISCONTINUED | OUTPATIENT
Start: 2020-10-20 | End: 2020-10-20 | Stop reason: SDUPTHER

## 2020-10-20 RX ORDER — MORPHINE SULFATE 4 MG/ML
4 INJECTION, SOLUTION INTRAMUSCULAR; INTRAVENOUS
Status: DISCONTINUED | OUTPATIENT
Start: 2020-10-20 | End: 2020-10-21 | Stop reason: HOSPADM

## 2020-10-20 RX ORDER — OXYCODONE HYDROCHLORIDE 5 MG/1
10 TABLET ORAL EVERY 4 HOURS PRN
Status: DISCONTINUED | OUTPATIENT
Start: 2020-10-20 | End: 2020-10-21 | Stop reason: HOSPADM

## 2020-10-20 RX ORDER — SODIUM CHLORIDE 0.9 % (FLUSH) 0.9 %
10 SYRINGE (ML) INJECTION EVERY 12 HOURS SCHEDULED
Status: DISCONTINUED | OUTPATIENT
Start: 2020-10-20 | End: 2020-10-20 | Stop reason: HOSPADM

## 2020-10-20 RX ORDER — ONDANSETRON 2 MG/ML
INJECTION INTRAMUSCULAR; INTRAVENOUS PRN
Status: DISCONTINUED | OUTPATIENT
Start: 2020-10-20 | End: 2020-10-20 | Stop reason: SDUPTHER

## 2020-10-20 RX ORDER — OXYCODONE HYDROCHLORIDE 5 MG/1
5 TABLET ORAL EVERY 4 HOURS PRN
Status: DISCONTINUED | OUTPATIENT
Start: 2020-10-20 | End: 2020-10-21 | Stop reason: HOSPADM

## 2020-10-20 RX ORDER — SODIUM CHLORIDE 9 MG/ML
INJECTION, SOLUTION INTRAVENOUS CONTINUOUS
Status: DISCONTINUED | OUTPATIENT
Start: 2020-10-20 | End: 2020-10-21 | Stop reason: HOSPADM

## 2020-10-20 RX ORDER — SODIUM CHLORIDE 0.9 % (FLUSH) 0.9 %
10 SYRINGE (ML) INJECTION PRN
Status: DISCONTINUED | OUTPATIENT
Start: 2020-10-20 | End: 2020-10-20 | Stop reason: HOSPADM

## 2020-10-20 RX ORDER — GABAPENTIN 300 MG/1
300 CAPSULE ORAL 3 TIMES DAILY
Status: DISCONTINUED | OUTPATIENT
Start: 2020-10-20 | End: 2020-10-20

## 2020-10-20 RX ORDER — DEXAMETHASONE SODIUM PHOSPHATE 4 MG/ML
INJECTION, SOLUTION INTRA-ARTICULAR; INTRALESIONAL; INTRAMUSCULAR; INTRAVENOUS; SOFT TISSUE PRN
Status: DISCONTINUED | OUTPATIENT
Start: 2020-10-20 | End: 2020-10-20 | Stop reason: SDUPTHER

## 2020-10-20 RX ORDER — OXYCODONE HYDROCHLORIDE 5 MG/1
10 TABLET ORAL PRN
Status: COMPLETED | OUTPATIENT
Start: 2020-10-20 | End: 2020-10-20

## 2020-10-20 RX ORDER — MELOXICAM 15 MG/1
15 TABLET ORAL DAILY
COMMUNITY

## 2020-10-20 RX ORDER — SENNA AND DOCUSATE SODIUM 50; 8.6 MG/1; MG/1
1 TABLET, FILM COATED ORAL 2 TIMES DAILY
Status: DISCONTINUED | OUTPATIENT
Start: 2020-10-20 | End: 2020-10-21 | Stop reason: HOSPADM

## 2020-10-20 RX ORDER — FENTANYL CITRATE 50 UG/ML
25 INJECTION, SOLUTION INTRAMUSCULAR; INTRAVENOUS EVERY 5 MIN PRN
Status: DISCONTINUED | OUTPATIENT
Start: 2020-10-20 | End: 2020-10-20 | Stop reason: HOSPADM

## 2020-10-20 RX ORDER — GABAPENTIN 300 MG/1
300 CAPSULE ORAL ONCE
Status: COMPLETED | OUTPATIENT
Start: 2020-10-20 | End: 2020-10-20

## 2020-10-20 RX ORDER — BUPIVACAINE HYDROCHLORIDE AND EPINEPHRINE 5; 5 MG/ML; UG/ML
INJECTION, SOLUTION EPIDURAL; INTRACAUDAL; PERINEURAL PRN
Status: DISCONTINUED | OUTPATIENT
Start: 2020-10-20 | End: 2020-10-20 | Stop reason: SDUPTHER

## 2020-10-20 RX ORDER — ACETAMINOPHEN 325 MG/1
650 TABLET ORAL EVERY 6 HOURS
Status: DISCONTINUED | OUTPATIENT
Start: 2020-10-20 | End: 2020-10-21 | Stop reason: HOSPADM

## 2020-10-20 RX ORDER — ONDANSETRON 2 MG/ML
4 INJECTION INTRAMUSCULAR; INTRAVENOUS
Status: DISCONTINUED | OUTPATIENT
Start: 2020-10-20 | End: 2020-10-20 | Stop reason: HOSPADM

## 2020-10-20 RX ORDER — LABETALOL HYDROCHLORIDE 5 MG/ML
5 INJECTION, SOLUTION INTRAVENOUS EVERY 10 MIN PRN
Status: DISCONTINUED | OUTPATIENT
Start: 2020-10-20 | End: 2020-10-20 | Stop reason: HOSPADM

## 2020-10-20 RX ORDER — CELECOXIB 100 MG/1
200 CAPSULE ORAL ONCE
Status: COMPLETED | OUTPATIENT
Start: 2020-10-20 | End: 2020-10-20

## 2020-10-20 RX ORDER — OXYCODONE HYDROCHLORIDE AND ACETAMINOPHEN 5; 325 MG/1; MG/1
1-2 TABLET ORAL EVERY 6 HOURS PRN
Qty: 56 TABLET | Refills: 0 | Status: SHIPPED | OUTPATIENT
Start: 2020-10-20 | End: 2020-10-27

## 2020-10-20 RX ORDER — CEPHALEXIN 250 MG/1
250 CAPSULE ORAL 4 TIMES DAILY
Qty: 4 CAPSULE | Refills: 0 | Status: SHIPPED | OUTPATIENT
Start: 2020-10-20 | End: 2020-10-21

## 2020-10-20 RX ORDER — SODIUM CHLORIDE 0.9 % (FLUSH) 0.9 %
10 SYRINGE (ML) INJECTION EVERY 12 HOURS SCHEDULED
Status: DISCONTINUED | OUTPATIENT
Start: 2020-10-20 | End: 2020-10-21 | Stop reason: HOSPADM

## 2020-10-20 RX ORDER — MORPHINE SULFATE 2 MG/ML
2 INJECTION, SOLUTION INTRAMUSCULAR; INTRAVENOUS
Status: DISCONTINUED | OUTPATIENT
Start: 2020-10-20 | End: 2020-10-21 | Stop reason: HOSPADM

## 2020-10-20 RX ORDER — FENTANYL CITRATE 50 UG/ML
INJECTION, SOLUTION INTRAMUSCULAR; INTRAVENOUS PRN
Status: DISCONTINUED | OUTPATIENT
Start: 2020-10-20 | End: 2020-10-20 | Stop reason: SDUPTHER

## 2020-10-20 RX ORDER — LIDOCAINE HYDROCHLORIDE 20 MG/ML
INJECTION, SOLUTION INFILTRATION; PERINEURAL PRN
Status: DISCONTINUED | OUTPATIENT
Start: 2020-10-20 | End: 2020-10-20 | Stop reason: SDUPTHER

## 2020-10-20 RX ORDER — MIDAZOLAM HYDROCHLORIDE 1 MG/ML
INJECTION INTRAMUSCULAR; INTRAVENOUS PRN
Status: DISCONTINUED | OUTPATIENT
Start: 2020-10-20 | End: 2020-10-20 | Stop reason: SDUPTHER

## 2020-10-20 RX ORDER — ACETAMINOPHEN 500 MG
1000 TABLET ORAL ONCE
Status: COMPLETED | OUTPATIENT
Start: 2020-10-20 | End: 2020-10-20

## 2020-10-20 RX ADMIN — CEFAZOLIN SODIUM 2 G: 10 INJECTION, POWDER, FOR SOLUTION INTRAVENOUS at 07:27

## 2020-10-20 RX ADMIN — DEXAMETHASONE SODIUM PHOSPHATE 10 MG: 4 INJECTION, SOLUTION INTRAMUSCULAR; INTRAVENOUS at 07:39

## 2020-10-20 RX ADMIN — Medication 100 MCG: at 07:50

## 2020-10-20 RX ADMIN — PROPOFOL 25 MG: 10 INJECTION, EMULSION INTRAVENOUS at 08:48

## 2020-10-20 RX ADMIN — BUPIVACAINE HYDROCHLORIDE AND EPINEPHRINE 10 ML: 5; 5 INJECTION, SOLUTION EPIDURAL; INTRACAUDAL; PERINEURAL at 07:18

## 2020-10-20 RX ADMIN — ACETAMINOPHEN 650 MG: 325 TABLET ORAL at 23:05

## 2020-10-20 RX ADMIN — GABAPENTIN 300 MG: 300 CAPSULE ORAL at 06:45

## 2020-10-20 RX ADMIN — HYDROMORPHONE HYDROCHLORIDE 0.5 MG: 1 INJECTION, SOLUTION INTRAMUSCULAR; INTRAVENOUS; SUBCUTANEOUS at 09:45

## 2020-10-20 RX ADMIN — BUPIVACAINE HYDROCHLORIDE AND EPINEPHRINE 10 ML: 5; 5 INJECTION, SOLUTION EPIDURAL; INTRACAUDAL; PERINEURAL at 07:17

## 2020-10-20 RX ADMIN — FAMOTIDINE 20 MG: 10 INJECTION INTRAVENOUS at 06:46

## 2020-10-20 RX ADMIN — PROPOFOL 200 MG: 10 INJECTION, EMULSION INTRAVENOUS at 07:31

## 2020-10-20 RX ADMIN — FENTANYL CITRATE 50 MCG: 50 INJECTION INTRAMUSCULAR; INTRAVENOUS at 08:08

## 2020-10-20 RX ADMIN — ROCURONIUM BROMIDE 10 MG: 10 SOLUTION INTRAVENOUS at 08:22

## 2020-10-20 RX ADMIN — OXYCODONE 5 MG: 5 TABLET ORAL at 13:31

## 2020-10-20 RX ADMIN — SODIUM CHLORIDE, POTASSIUM CHLORIDE, SODIUM LACTATE AND CALCIUM CHLORIDE: 600; 310; 30; 20 INJECTION, SOLUTION INTRAVENOUS at 07:27

## 2020-10-20 RX ADMIN — FENTANYL CITRATE 100 MCG: 50 INJECTION INTRAMUSCULAR; INTRAVENOUS at 07:15

## 2020-10-20 RX ADMIN — ACETAMINOPHEN 1000 MG: 500 TABLET ORAL at 06:46

## 2020-10-20 RX ADMIN — Medication 100 MCG: at 07:48

## 2020-10-20 RX ADMIN — CELECOXIB 200 MG: 100 CAPSULE ORAL at 06:45

## 2020-10-20 RX ADMIN — Medication 100 MCG: at 08:33

## 2020-10-20 RX ADMIN — Medication 100 MCG: at 08:26

## 2020-10-20 RX ADMIN — BUPIVACAINE HYDROCHLORIDE AND EPINEPHRINE 10 ML: 5; 5 INJECTION, SOLUTION EPIDURAL; INTRACAUDAL; PERINEURAL at 07:16

## 2020-10-20 RX ADMIN — PROPOFOL 25 MG: 10 INJECTION, EMULSION INTRAVENOUS at 08:54

## 2020-10-20 RX ADMIN — FENTANYL CITRATE 50 MCG: 50 INJECTION INTRAMUSCULAR; INTRAVENOUS at 09:06

## 2020-10-20 RX ADMIN — CEFAZOLIN SODIUM 2 G: 10 INJECTION, POWDER, FOR SOLUTION INTRAVENOUS at 16:44

## 2020-10-20 RX ADMIN — ROCURONIUM BROMIDE 25 MG: 10 SOLUTION INTRAVENOUS at 07:58

## 2020-10-20 RX ADMIN — ONDANSETRON 4 MG: 2 INJECTION INTRAMUSCULAR; INTRAVENOUS at 07:39

## 2020-10-20 RX ADMIN — BUPIVACAINE HYDROCHLORIDE AND EPINEPHRINE 10 ML: 5; 5 INJECTION, SOLUTION EPIDURAL; INTRACAUDAL; PERINEURAL at 07:19

## 2020-10-20 RX ADMIN — SODIUM CHLORIDE, POTASSIUM CHLORIDE, SODIUM LACTATE AND CALCIUM CHLORIDE: 600; 310; 30; 20 INJECTION, SOLUTION INTRAVENOUS at 08:54

## 2020-10-20 RX ADMIN — Medication 200 MCG: at 08:28

## 2020-10-20 RX ADMIN — ACETAMINOPHEN 650 MG: 325 TABLET ORAL at 16:43

## 2020-10-20 RX ADMIN — Medication 100 MCG: at 07:45

## 2020-10-20 RX ADMIN — CEFAZOLIN SODIUM 2 G: 10 INJECTION, POWDER, FOR SOLUTION INTRAVENOUS at 23:29

## 2020-10-20 RX ADMIN — Medication 100 MCG: at 07:42

## 2020-10-20 RX ADMIN — SODIUM CHLORIDE, POTASSIUM CHLORIDE, SODIUM LACTATE AND CALCIUM CHLORIDE: 600; 310; 30; 20 INJECTION, SOLUTION INTRAVENOUS at 06:46

## 2020-10-20 RX ADMIN — FENTANYL CITRATE 100 MCG: 50 INJECTION INTRAMUSCULAR; INTRAVENOUS at 07:30

## 2020-10-20 RX ADMIN — MIDAZOLAM HYDROCHLORIDE 2 MG: 2 INJECTION, SOLUTION INTRAMUSCULAR; INTRAVENOUS at 07:15

## 2020-10-20 RX ADMIN — ROCURONIUM BROMIDE 50 MG: 10 SOLUTION INTRAVENOUS at 07:31

## 2020-10-20 RX ADMIN — FENTANYL CITRATE 50 MCG: 50 INJECTION INTRAMUSCULAR; INTRAVENOUS at 07:58

## 2020-10-20 RX ADMIN — LIDOCAINE HYDROCHLORIDE 100 MG: 20 INJECTION, SOLUTION INFILTRATION; PERINEURAL at 07:30

## 2020-10-20 ASSESSMENT — PAIN DESCRIPTION - PAIN TYPE
TYPE: SURGICAL PAIN

## 2020-10-20 ASSESSMENT — PULMONARY FUNCTION TESTS
PIF_VALUE: 20
PIF_VALUE: 18
PIF_VALUE: 20
PIF_VALUE: 1
PIF_VALUE: 7
PIF_VALUE: 20
PIF_VALUE: 19
PIF_VALUE: 20
PIF_VALUE: 19
PIF_VALUE: 19
PIF_VALUE: 21
PIF_VALUE: 20
PIF_VALUE: 21
PIF_VALUE: 21
PIF_VALUE: 18
PIF_VALUE: 19
PIF_VALUE: 20
PIF_VALUE: 19
PIF_VALUE: 20
PIF_VALUE: 21
PIF_VALUE: 19
PIF_VALUE: 19
PIF_VALUE: 21
PIF_VALUE: 1
PIF_VALUE: 19
PIF_VALUE: 20
PIF_VALUE: 20
PIF_VALUE: 19
PIF_VALUE: 13
PIF_VALUE: 20
PIF_VALUE: 19
PIF_VALUE: 13
PIF_VALUE: 18
PIF_VALUE: 21
PIF_VALUE: 20
PIF_VALUE: 1
PIF_VALUE: 20
PIF_VALUE: 19
PIF_VALUE: 14
PIF_VALUE: 20
PIF_VALUE: 20
PIF_VALUE: 18
PIF_VALUE: 20
PIF_VALUE: 19
PIF_VALUE: 20
PIF_VALUE: 15
PIF_VALUE: 20
PIF_VALUE: 1
PIF_VALUE: 20
PIF_VALUE: 19
PIF_VALUE: 20
PIF_VALUE: 22
PIF_VALUE: 20
PIF_VALUE: 20
PIF_VALUE: 18
PIF_VALUE: 13
PIF_VALUE: 1
PIF_VALUE: 20
PIF_VALUE: 20
PIF_VALUE: 19
PIF_VALUE: 19
PIF_VALUE: 20
PIF_VALUE: 14
PIF_VALUE: 20
PIF_VALUE: 19
PIF_VALUE: 1
PIF_VALUE: 19
PIF_VALUE: 19
PIF_VALUE: 20
PIF_VALUE: 20
PIF_VALUE: 19
PIF_VALUE: 2
PIF_VALUE: 13
PIF_VALUE: 20
PIF_VALUE: 21
PIF_VALUE: 20
PIF_VALUE: 20
PIF_VALUE: 19
PIF_VALUE: 20
PIF_VALUE: 0
PIF_VALUE: 19
PIF_VALUE: 18
PIF_VALUE: 20
PIF_VALUE: 13
PIF_VALUE: 10
PIF_VALUE: 19
PIF_VALUE: 20

## 2020-10-20 ASSESSMENT — PAIN DESCRIPTION - DESCRIPTORS
DESCRIPTORS: ACHING
DESCRIPTORS: TIGHTNESS
DESCRIPTORS: ACHING
DESCRIPTORS: ACHING;SORE

## 2020-10-20 ASSESSMENT — PAIN DESCRIPTION - LOCATION
LOCATION: HIP

## 2020-10-20 ASSESSMENT — PAIN SCALES - GENERAL
PAINLEVEL_OUTOF10: 4
PAINLEVEL_OUTOF10: 0
PAINLEVEL_OUTOF10: 4
PAINLEVEL_OUTOF10: 0
PAINLEVEL_OUTOF10: 0
PAINLEVEL_OUTOF10: 7
PAINLEVEL_OUTOF10: 1
PAINLEVEL_OUTOF10: 0
PAINLEVEL_OUTOF10: 4

## 2020-10-20 ASSESSMENT — PAIN DESCRIPTION - ORIENTATION
ORIENTATION: LEFT

## 2020-10-20 ASSESSMENT — LIFESTYLE VARIABLES: SMOKING_STATUS: 0

## 2020-10-20 ASSESSMENT — PAIN DESCRIPTION - FREQUENCY: FREQUENCY: CONTINUOUS

## 2020-10-20 ASSESSMENT — ENCOUNTER SYMPTOMS: SHORTNESS OF BREATH: 0

## 2020-10-20 ASSESSMENT — PAIN - FUNCTIONAL ASSESSMENT: PAIN_FUNCTIONAL_ASSESSMENT: 0-10

## 2020-10-20 NOTE — ANESTHESIA PROCEDURE NOTES
Peripheral Block    Patient location during procedure: pre-op  Start time: 10/20/2020 7:15 AM  End time: 10/20/2020 7:20 AM  Staffing  Anesthesiologist: Severo Campos MD  Performed: anesthesiologist   Preanesthetic Checklist  Completed: patient identified, site marked, surgical consent, pre-op evaluation, timeout performed, IV checked, risks and benefits discussed, monitors and equipment checked, anesthesia consent given, oxygen available and patient being monitored  Peripheral Block  Patient position: supine  Prep: ChloraPrep  Patient monitoring: cardiac monitor, continuous pulse ox, frequent blood pressure checks and IV access  Block type: Fascia iliaca  Laterality: left  Injection technique: single-shot  Procedures: ultrasound guided  Provider prep: sterile gloves and mask  Needle  Needle gauge: 22 G  Needle length: 8 cm  Needle localization: ultrasound guidance  Test dose: negative  Assessment  Injection assessment: negative aspiration for heme and no paresthesia on injection  Paresthesia pain: none  Slow fractionated injection: yes  Hemodynamics: stable  Additional Notes  Pt. agrees to risks, benefits and alternatives to block  Block performed at the request of the surgeon for post-operative pain management   Immediately prior to procedure a \"time out\" was called to verify the correct patient, procedure, equipment, support staff. Site/side marked as required  Side: left  Site/Approach: Anterior Hip 2 cm inferior to the ASIS  Position: Supine  Sedation: Midazolam 2 mg  +  Fentanyl  100  mcg  IV  Local Anesthetic Dose:  2%  Lido  4 ml  Aseptic technique: prepped with chlorhexidine  Ultrasound:   yes, see attached image  Local Anesthetic:  0.5% Bupivacaine with Epi 1:200K Amount: 40 ml  in 5 ml increments after negative aspiration each time. Easy injection w/o resistance and w/o pain/paresthesias. Pt tolerated procedure well. No complications.   Reason for block: post-op pain management and at surgeon's request

## 2020-10-20 NOTE — PROGRESS NOTES
Physical Therapy    Facility/Department: [facility] OR  Initial Assessment    NAME: Inez Underwood  : 1975  MRN: [id number]    Date of Service: 10/20/2020    Discharge Recommendations:  Home with assist PRN, Home with Home health PT(anticipate home with assist PRN & home PT, once quad control returns)   PT Equipment Recommendations  Equipment Needed: No    Assessment   Body structures, Functions, Activity limitations: Decreased functional mobility ; Decreased ROM; Decreased strength;Decreased balance; Increased pain  Assessment: Pt referred for PT evaluation during current hospital stay s/p scheduled L THR surgery on 10/20/20. Pt currently limited to in-bed activity only due to lack of L quad control from nerve block. Despite LLE numbness, pt participated well in supine LE ther ex and pt and wife seem well-versed in hip precautions after pt/family education. Pt still hoping to be able to D/C home on POD #0, so therapy team plans to return later today if pt's quad function improves to the point where amb can be attempted. Will continue to follow. Treatment Diagnosis: Decreased ROM/strength LLE and (I) with mobility s/p L THR  Specific instructions for Next Treatment: Progress ther ex and mobility as tolerated, assess OOB activity at next session  Prognosis: Good  Decision Making: Low Complexity  PT Education: Goals; General Safety;Gait Training;PT Role;Plan of Care;Family Education; Functional Mobility Training;Disease Specific Education;Home Exercise Program;Precautions;Transfer Training;Weight-bearing Education  Patient Education: Disease-specific education: Pt and pt's wife educated on HEP for L hip ROM/strengthening and in anterolateral hip precautions; pt verbalizes understanding. REQUIRES PT FOLLOW UP: Yes  Activity Tolerance: Patient Tolerated treatment well; Other  Activity Tolerance: Treatment limited to in-bed activity only due to lack of quad control/function on LLE from nerve block.        Patient Diagnosis(es): The primary encounter diagnosis was Status post total replacement of left hip. A diagnosis of Primary osteoarthritis of left hip was also pertinent to this visit. has no past medical history on file. has no past surgical history on file. Restrictions  Restrictions/Precautions  Restrictions/Precautions: Weight Bearing, ROM Restrictions, Fall Risk, Surgical Protocols  Lower Extremity Weight Bearing Restrictions  Left Lower Extremity Weight Bearing: Partial Weight Bearing  Partial Weight Bearing Percentage Or Pounds: 50%  Position Activity Restriction  Hip Precautions: No hip external rotation, No hip extension, No ADduction  Vision/Hearing  Vision: Impaired  Vision Exceptions: Wears glasses at all times  Hearing: Exceptions to [facility]  Hearing Exceptions: Hard of hearing/hearing concerns     Subjective  General  Chart Reviewed: Yes  Patient assessed for rehabilitation services?: Yes  Family / Caregiver Present: Yes(wife)  Referring Practitioner: Dr. Diane Jang and NORM Somers  Referral Date : 10/20/20  Diagnosis: L hip OA, s/p L anterolateral THR on 10/20/20  Follows Commands: Within Functional Limits  General Comment  Comments: Pt resting in bed upon entry of PT, evaluated in PACU  Subjective  Subjective: Pt agreeable to work with PT this morning. States he's still feeling numb in LLE and \"I can't move my L leg much yet. \"  Pain Screening  Patient Currently in Pain: Yes  Pain Assessment  Pain Assessment: 0-10  Pain Level: 4  Pain Type: Surgical pain  Pain Location: Hip  Pain Orientation: Left  Pain Descriptors: Aching; Sore  Pain Frequency: Continuous  Non-Pharmaceutical Pain Intervention(s): Ambulation/Increased Activity;Repositioned;Cold applied; Emotional support  Intervention List: Patient able to continue with treatment    Orientation  Orientation  Overall Orientation Status: Within Normal Limits     Social/Functional History  Social/Functional History  Lives With: Family(spouse and 3 kids)  Type of Home: House  Home Layout: Two level, Performs ADL's on one level, Able to Live on Main level with bedroom/bathroom  Home Access: Stairs to enter without rails  Entrance Stairs - Number of Steps: 1 ZACH  Bathroom Shower/Tub: Walk-in shower  Bathroom Toilet: Handicap height  Bathroom Equipment: Built-in shower seat, Shower chair  Home Equipment: Rolling walker  ADL Assistance: Independent  Homemaking Assistance: Independent  Homemaking Responsibilities: Yes  Ambulation Assistance: Independent  Transfer Assistance: Independent  Active : Yes  Occupation: Full time employment  Type of occupation: accounting  Leisure & Hobbies: road biking    Objective  RLE AROM: WNL  LLE General AROM: WFL ankle, decreased ~25% in hip and knee flexion following recent surgery, pt without active knee extension due to numbness from nerve block  Strength RLE: WFL  Strength LLE: 3+/5 in ankle, less than 3/5 in hip due to recent surgery, 1/5 quadriceps (limited in strength by nerve block)     Bed mobility  Supine to Sit: Unable to assess(deferred OOB activity this session due to numbness from nerve block; will attempt at second session if able (if quad function returns), as pt is hoping to return home on POD #0)     Transfers  Sit to Stand: Unable to assess(unsafe to attempt standing activity due to lack of quad function/control in LLE)     Ambulation  Ambulation?: No(unable to safely attempt given lack of quad function in LLE from nerve block)  WB Status: 50% WB LLE     Exercises  Quad Sets: x 10 BLE  Heelslides: x 10 LLE with Jr  Gluteal Sets: x 10 bilat  Knee Short Arc Quad: x 10 LLE (passive, pt unable to actively extend L knee due to lack of quad control from block)  Ankle Pumps: x 15 BLE  Comments: Pt issued HEP for L hip ROM/strengthening with listed hip precautions; pt and wife verbalize understanding.      Plan   Times per week: BID 7x/week  Times per day: Twice a day  Specific instructions for Next Treatment: Progress ther ex and mobility as tolerated, assess OOB activity at next session  Current Treatment Recommendations: Strengthening, ROM, Functional Mobility Training, Transfer Training, Gait Training, Stair training, Home Exercise Program, Safety Education & Training, Patient/Caregiver Education & Training, Equipment Evaluation, Education, & procurement  Safety Devices: All fall risk precautions in place, Nurse notified, Patient at risk for falls, Left in bed, Call light within reach    AM-PAC Score  AM-PAC Inpatient Mobility Raw Score : 10 (10/20/20 1518)  AM-PAC Inpatient T-Scale Score : 32.29 (10/20/20 1518)  Mobility Inpatient CMS 0-100% Score: 76.75 (10/20/20 1518)  Mobility Inpatient CMS G-Code Modifier : CL (10/20/20 1518)    Goals  Short term goals  Time Frame for Short term goals: 1 day, 10/21/20  Short term goal 1: Pt will transfer supine <-> sit with modified(I)  Short term goal 2: Pt will transfer sit <-> stand and bed>chair using RW with modified(I)  Short term goal 3: Pt will ambulate x 200 feet using RW with supervision  Short term goal 4: Pt will tolerate 12-15 reps LLE ther ex for ROM/strengthening  Short term goal 5: Pt will ambulate up/down 6\" curb step using RW with SBA  Patient Goals   Patient goals : \"To be able to walk around the house (distances of ~50 feet) using my walker without help from my wife (supervision)\" by 10/21/20       Therapy Time   Individual Concurrent Group Co-treatment   Time In 1135         Time Out 1155         Minutes 20         Time Coded Treatment Minutes: 10 (10-minute evaluation, 10 minutes of treatment)     Olya Cummins, PT, DPT #[id number]      PT Addendum 10/20/20 @ 66 91 21:  Pt see for second half of PT evaluation later this afternoon to assess OOB mobility. \"I'd like to try to get up and use the bathroom if I can. \"  Pt states he's now planning on spending the night in the hospital due to continued numbness in LLE.   Pt rated his pain at 7/10 in L hip at rest with cold pack applied to hip after session. Pt able to perform the following mobility tasks:    Bed Mobility  Supine to Sit: Supervision (moving toward L side, HOB elevated ~30 degrees)  Sit to Supine: MaxA x 2 (high assist needed secondary to pt having 2 syncopal episodes 2* orthostatic hypotension)  Scooting: MaxA x 2 (to scoot up in bed)    Transfers  Sit to Stand: Minimal assistance (from EOB to RW)  Stand to Sit: Minimal assistance (from RW to toilet)  Bed to Chair: Minimal assistance (using RW, moving from bed>toilet>chair, cues needed to hop on RLE given lack of quad control on LLE)  Comments: Pt required maxA x 2 with use of Stedy for quick transfer from chair>bed 2* orthostatic hypotension with pt experiencing 2 syncopal episodes (~15-20 seconds in length each). Clau TAYLOR, aware and present in room to assist pt back to bed. Ambulation  Pt amb 2 x 15 feet using RW with Jr x 1 with cues needed to WB only through RLE given poor L quad control. Pt able to hop safely on RLE with good standing balance although with increasing c/o dizziness while upright. Vitals  BP values during session: 69/47 (immediately after amb), 86/55 (immediately after returning to supine), 104/65 (after lying in supine x 5 minutes). Clau TAYLOR, present during last portion of session to assist pt back to bed and monitor vitals. Pt very diaphoretic after his 2 syncopal episodes, although color returning with pt reporting feeling \"more back to normal\" after lying in supine for several minutes. Minutes: [id number] (40 total minutes, 20 charged minutes for PT - split time with co-evaluating OT due to pt being in \"outpatient in a bed\" status)    Justin Nunn, PT, DPT #[id number]      If pt is unable to be seen after this session, please let this note serve as discharge summary. Please see case management note for discharge disposition. Thank you.

## 2020-10-20 NOTE — CONSULTS
Hospital Medicine  Consult History & Physical        Reason for consult: Medical management    Date of Service: Pt seen/examined in consultation on 10/20/2020    History Of Present Illness:      53 y.o. male who we are asked to see/evaluate by Cherelle Beckham MD for medical management   Pt presented today for an elective surgery left total hip replacement by Dr. Sheri Bucio. Surgery went as planned without any complications, EBL was 352 cc as reported on surgical procedure note. Pt is currently seen on the floor post op. She seems to be doing fairly well, c/o mild soreness in the left hip region that is well controlled with the pain medications ordered, apart from that she denies any chest pain, dyspnea, abdominal pain, fevers or chills, nausea or vomiting. Hospitalist team was consulted for medical management. Patient denies any significant medical conditions. Past Medical History:    History reviewed. No pertinent past medical history. Past Surgical History:    History reviewed. No pertinent surgical history. Medications Prior to Admission:    Prior to Admission medications    Medication Sig Start Date End Date Taking? Authorizing Provider   oxyCODONE-acetaminophen (PERCOCET) 5-325 MG per tablet Take 1-2 tablets by mouth every 6 hours as needed for Pain for up to 7 days. Take lowest dose possible to manage pain 10/20/20 10/27/20 Yes NORM Arriaga   apixaban (ELIQUIS) 2.5 MG TABS tablet Take 1 tablet by mouth 2 times daily 10/20/20  Yes NORM Arriaga   cephALEXin (KEFLEX) 250 MG capsule Take 1 capsule by mouth 4 times daily for 1 day Take 10/21/2020 10/20/20 10/21/20 Yes NORM Arriaga       Allergies:  Patient has no known allergies. Social History:      The patient currently lives at home    TOBACCO:   reports that he has never smoked. He has never used smokeless tobacco.  ETOH:   reports no history of alcohol use.       Family History:  Positive as follows:        Problem Relation Age of Onset   Venkat Spina Cancer Mother     Cancer Father        REVIEW OF SYSTEMS:   Pertinent positives as noted in the HPI. All other systems reviewed and negative. PHYSICAL EXAM PERFORMED:  /65   Pulse 87   Temp 98.9 °F (37.2 °C)   Resp 20   Ht 5' 8\" (1.727 m)   Wt 202 lb (91.6 kg)   SpO2 98%   BMI 30.71 kg/m²   General appearance: No apparent distress, appears stated age and cooperative. HEENT: Normal cephalic, atraumatic without obvious deformity. Pupils equal, round, and reactive to light. Extra ocular muscles intact. Conjunctivae/corneas clear. Neck: Supple, with full range of motion. No jugular venous distention. Trachea midline. Respiratory:  Normal respiratory effort. Clear to auscultation, bilaterally without Rales/Wheezes/Rhonchi. Cardiovascular: Regular rate and rhythm with normal S1/S2 without murmurs, rubs or gallops. Abdomen: Soft, non-tender, non-distended with normal bowel sounds. Musculoskeletal: No clubbing, cyanosis or edema bilaterally. Left thigh  dressing in place  Skin: Skin color, texture, turgor normal.  No rashes or lesions. Neurologic:  Neurovascularly intact without any focal sensory/motor deficits. Cranial nerves: II-XII intact, grossly non-focal.  Psychiatric: Alert and oriented, thought content appropriate, normal insight  Capillary Refill: Brisk,< 3 seconds   Peripheral Pulses: +2 palpable, equal bilaterally     Labs:     No results for input(s): WBC, HGB, HCT, PLT in the last 72 hours. No results for input(s): NA, K, CL, CO2, BUN, CREATININE, CALCIUM, PHOS in the last 72 hours. Invalid input(s): MAGNES  No results for input(s): AST, ALT, BILIDIR, BILITOT, ALKPHOS in the last 72 hours. No results for input(s): INR in the last 72 hours. No results for input(s): Verlena Colten in the last 72 hours.     Urinalysis:    Lab Results   Component Value Date    NITRU Negative 10/09/2020    BLOODU Negative 10/09/2020    SPECGRAV 1.020 10/09/2020    GLUCOSEU Negative 10/09/2020       Radiology: I have reviewed the radiology reports with the following interpretation:     XR HIP LEFT (1 VIEW)   Final Result   No acute complication following left hip arthroplasty. ASSESSMENT:PLAN:    Status post total replacement of left hip  -Perioperative antibiotics and pain management per orthopedics  -Recommend incentive spirometry, DVT PPx as ordered  -Monitor for postop anemia with CBC in a.m.  -PT/OT per orthopedics    Postop anemia  -Check CBC in a.m.     DVT Prophylaxis: On Eliquis  Diet: DIET GENERAL;  Code Status: Full Code    PT/OT Eval Status: Active and ongoing    Dispo -per orthopedics, no barriers to discharge per IM    Thank you for the consultation, will follow up as needed    Electronically signed by Merlin Garg MD on 10/20/20 at 7:21 PM EDT

## 2020-10-20 NOTE — PROGRESS NOTES
4 Eyes Skin Assessment     The patient is being assess for   Post-Op Surgical    I agree that 2 RN's have performed a thorough Head to Toe Skin Assessment on the patient. ALL assessment sites listed below have been assessed. Areas assessed by both nurses:   [x]   Head, Face, and Ears   [x]   Shoulders, Back, and Chest, Abdomen  [x]   Arms, Elbows, and Hands   [x]   Coccyx, Sacrum, and Ischium  [x]   Legs, Feet, and Heels        No skin issues    **SHARE this note so that the co-signing nurse is able to place an eSignature**    Co-signer eSignature: Electronically signed by Phillip Rocha RN on 10/20/20 at 3:55 PM EDT    Does the Patient have Skin Breakdown?   {Blank single:96352::\"No\",\"Yes LDA WOUND CARE was Initiated documentation include the Angeli-wound, Wound Assessment, Measurements, Dressing Treatment, Drainage, and Color\",\"}          Benjamin Prevention initiated:  {YES/NO:19732}   Wound Care Orders initiated:  {YES/NO:19732}      [facility] nurse consulted for Pressure Injury (Stage 3,4, Unstageable, DTI, NWPT, Complex wounds)and New or Established Ostomies:  {YES/NO:19732}      Primary Nurse eSignature: {Esignature:[id number]}

## 2020-10-20 NOTE — BRIEF OP NOTE
Brief Postoperative Note      Patient: Farhad Kirkpatrick  YOB: 1975  MRN: [id number]    Date of Procedure: 10/20/2020    Pre-Op Diagnosis: OSTEOARTHRITIS LEFT HIP    Post-Op Diagnosis: Same       Procedure(s):  LEFT TOTAL HIP REPLACEMENT WITH Wallace Torres & NEPHJAYNE    Surgeon(s):  Jeanine Redd MD    Assistant:  Surgical Assistant: Pavithra Scanlon; Diallo Coulter  Physician Assistant: Mendel Provost, PA    Anesthesia: General    Estimated Blood Loss (mL): 335    Complications: None    Specimens:   ID Type Source Tests Collected by Time Destination   A : LEFT FEMORAL HEAD  Specimen Joint, Hip SURGICAL PATHOLOGY Jeanine Redd MD 10/20/2020 0802        Implants:  Implant Name Type Inv.  Item Serial No.  Lot No. LRB No. Used Action   IMPL HIP REF THRD HOLE COVER Hip IMPL HIP REF THRD HOLE COVER  SMITH [id number] Left 1 Implanted   IMPL HIP SHLL ACET R3 3HL GUY SZ 52MM Hip IMPL HIP SHLL ACET R3 3HL GUY SZ 52MM  SMITH [id number] Left 1 Implanted   IMPL HIP LINER ACET R3 20DEG [id number] Hip IMPL HIP LINER ACET R3 20DEG [id number]  [place] [id number] Left 1 Implanted   SCREW HIP [employer] SOUTH HEAD 6.5X20MM Screw/Plate/Nail/Sang SCREW HIP [employer] SOUTH HEAD 6.5X20MM  SMITH [id number] Left 1 Implanted   IMPL HIP RE SCREW HL COVER Hip IMPL HIP RE SCREW HL COVER  [employer]ITH [id number] Left 1 Implanted   SCREW HIP [employer] SOUTH HEAD 6.5X20MM Screw/Plate/Nail/Sang SCREW HIP [employer] SOUTH HEAD 6.5X20MM  SMITH [id number] Left 1 Implanted   IMPL HIP FEM STEM SYN PORS SZ 13 155MM Hip IMPL HIP FEM STEM SYN PORS SZ 13 155MM  [employer] [id number] Left 1 Implanted   IMPL HIP FEM HEAD OXINIUM 12TO14 TAPR 3 Hip IMPL HIP FEM HEAD OXINIUM 12TO14 TAPR 3  SMITH [id number] Left 1 Implanted         Drains: * No LDAs found *    Findings: OA    Electronically signed by NORM Ambrose on 10/20/2020 at 9:01 AM

## 2020-10-20 NOTE — PROGRESS NOTES
Patient admitted from OR to PACU. Bedside report received. Patient immediately hooked up to heart monitor. Ty Ramesh

## 2020-10-20 NOTE — ANESTHESIA PRE PROCEDURE
Department of Anesthesiology  Preprocedure Note       Name:  Jarrod Lemons   Age:  53 y.o.  :  1975                                          MRN:  [id number]         Date:  10/20/2020      Surgeon: Esthela Palm):  Mercedes Chavez MD    Procedure: Procedure(s):  LEFT TOTAL HIP REPLACEMENT WITH CELLSAVER          [employer]    Medications prior to admission:   Prior to Admission medications    Medication Sig Start Date End Date Taking?  Authorizing Provider   Misc Natural Products (GLUCOSAMINE CHOND COMPLEX/MSM PO) Take by mouth   Yes Historical Provider, MD   meloxicam (MOBIC) 15 MG tablet Take 1 tablet by mouth daily 7/6/20 10/12/20 Yes Mercedes Chavez MD   mupirocin [facility]) 2 % ointment Apply bid in each nostril every day 5 days before planned surgical procedure 10/14/20   NORM Emmanuel       Current medications:    Current Facility-Administered Medications   Medication Dose Route Frequency Provider Last Rate Last Dose    ceFAZolin (ANCEF) 2 g in dextrose 5 % 100 mL IVPB  2 g Intravenous On Call to Dahlia Elizabeth MD        lactated ringers infusion   Intravenous Continuous Deanna Cordero MD        sodium chloride flush 0.9 % injection 10 mL  10 mL Intravenous 2 times per day Deanna Cordero MD        sodium chloride flush 0.9 % injection 10 mL  10 mL Intravenous PRN Deanna Cordero MD        famotidine (PEPCID) injection 20 mg  20 mg Intravenous Once Deanna Cordero MD        acetaminophen (TYLENOL) tablet 1,000 mg  1,000 mg Oral Once Juan Hines MD        celecoxib (CELEBREX) capsule 100 mg  100 mg Oral BID Juan Hines MD        celecoxib (CELEBREX) capsule 200 mg  200 mg Oral Once Juan Hines MD        gabapentin (NEURONTIN) capsule 300 mg  300 mg Oral Once Juan Hines MD        gabapentin (NEURONTIN) capsule 300 mg  300 mg Oral TID Juan Hines MD           Allergies:  No results found for: [place] Cisneros    Drug/Infectious Status (If Applicable):  No results found for: HIV, HEPCAB    COVID-19 Screening (If Applicable):   Lab Results   Component Value Date    COVID19 NOT DETECTED 10/16/2020         Anesthesia Evaluation  Patient summary reviewed no history of anesthetic complications:   Airway: Mallampati: II  TM distance: >3 FB   Neck ROM: full  Mouth opening: > = 3 FB Dental:          Pulmonary:Negative Pulmonary ROS breath sounds clear to auscultation      (-) COPD, asthma, shortness of breath, sleep apnea and not a current smoker          Patient did not smoke on day of surgery. Cardiovascular:Negative CV ROS        (-) pacemaker, hypertension, past MI, CAD, CABG/stent, dysrhythmias,  angina and  CHF    ECG reviewed  Rhythm: regular  Rate: normal           Beta Blocker:  Not on Beta Blocker         Neuro/Psych:   Negative Neuro/Psych ROS     (-) seizures, TIA, CVA and psychiatric history           GI/Hepatic/Renal: Neg GI/Hepatic/Renal ROS       (-) GERD, liver disease, no renal disease, bowel prep and no morbid obesity       Endo/Other:    (+) : arthritis:., no malignancy/cancer. (-) diabetes mellitus, hypothyroidism, hyperthyroidism, blood dyscrasia, no malignancy/cancer               Abdominal:           Vascular: negative vascular ROS. Anesthesia Plan      general     ASA 2     ( preop F/I blk for pop pain assist, consented!)  Induction: intravenous. MIPS: Postoperative opioids intended and Prophylactic antiemetics administered. Anesthetic plan and risks discussed with patient. Plan discussed with CRNA. This pre-anesthesia assessment may be used as a history and physical.    DOS STAFF ADDENDUM:    Pt seen and examined, chart reviewed (including anesthesia, drug and allergy history). No interval changes to history and physical examination.   Anesthetic plan, risks, benefits, alternatives, and personnel involved discussed with patient. Patient verbalized an understanding and agrees to proceed.       Isidra Savage MD  October 20, 2020  6:29 AM          Isidra Savage MD   10/20/2020

## 2020-10-20 NOTE — DISCHARGE SUMMARY
Department of Orthopedic Surgery  Physician Assistant   Discharge Summary    The Farhad Kirkpatrick is a 53 y.o. male admitted for Left THR. Farhad Kirkpatrick was discharged home following His recovery in the PACU. Discharge Diagnosis  Left THR    Current Inpatient Medications    Current Facility-Administered Medications: ceFAZolin (ANCEF) 2 g in dextrose 5 % 100 mL IVPB, 2 g, Intravenous, On Call to OR  lactated ringers infusion, , Intravenous, Continuous  sodium chloride flush 0.9 % injection 10 mL, 10 mL, Intravenous, 2 times per day  sodium chloride flush 0.9 % injection 10 mL, 10 mL, Intravenous, PRN  celecoxib (CELEBREX) capsule 100 mg, 100 mg, Oral, BID  gabapentin (NEURONTIN) capsule 300 mg, 300 mg, Oral, TID  fentaNYL (SUBLIMAZE) injection 25 mcg, 25 mcg, Intravenous, Q5 Min PRN  fentaNYL (SUBLIMAZE) injection 50 mcg, 50 mcg, Intravenous, Q5 Min PRN  HYDROmorphone (DILAUDID) injection 0.25 mg, 0.25 mg, Intravenous, Q5 Min PRN  HYDROmorphone (DILAUDID) injection 0.5 mg, 0.5 mg, Intravenous, Q5 Min PRN  oxyCODONE (ROXICODONE) immediate release tablet 5 mg, 5 mg, Oral, PRN **OR** oxyCODONE (ROXICODONE) immediate release tablet 10 mg, 10 mg, Oral, PRN  ondansetron (ZOFRAN) injection 4 mg, 4 mg, Intravenous, Once PRN  promethazine (PHENERGAN) injection 6.25 mg, 6.25 mg, Intravenous, Once PRN  labetalol (NORMODYNE;TRANDATE) injection 5 mg, 5 mg, Intravenous, Q10 Min PRN  hydrALAZINE (APRESOLINE) injection 5 mg, 5 mg, Intravenous, Q10 Min PRN  meperidine (DEMEROL) injection 12.5 mg, 12.5 mg, Intravenous, Q5 Min PRN    Post-operatively the patients diet was advanced as tolerated and their dressing was changed on POD #1. The incision is dressing in place, clean, dry and intact with no signs of infection. The patient remained neurovascularly intact in the left lower and had intact pulses distally. Patients calf remained soft and showed no evidence of DVT.   The patient was able to move their left lower extremity without any problems post-operatively. Physical therapy and occupational therapy were consulted and began working with the patient post-operatively. The patient progressed with PT/OT as would be expected and continued to improve through their stay. The patients pain was initially controlled with IV medications but we were able to transition to oral pain medications soon after arrival to the floor and their pain remained under good control through their hospital stay. From a medical standpoint the patient remained stable and continued to have the medicine team follow throughout their stay. The patient will be discharged at this time to Home  with their current diet restrictions and will continue to follow the precautions outlined to them by us and PT/OT. Condition on Discharge: Stable    Plan  Return visit in 2 weeks. .  Patient was instructed on the use of pain medications, the signs and symptoms of infection, and was given our number to call should they have any questions or concerns following discharge. For opioid prescriptions given at discharge the following statement is provided for compliance with [place]SM[employer] rules. Patient being given increased dosage/quantity of opoid pain medication in excess of OSMB guidelines which noted a 30 MED daily of opioids due to the fact that he/she has undergone major orthopaedic surgery as outlined in rule 4731-11-13. Dosages and further duration of the pain medication will be re-evaluated at her post op visit in 2 weeks. Patient was educated on dosing expectations and limits of prescribing as a result of the new [facility] Board rules enacted August 31, 2017. Please also note that this is not the initial opoid prescription issued to this patient but a continuation of medication utilized during the hospital admission as noted in the medical record.  OARRS report has also been utilized to screen for any abuse history or suspicious activity as outlined in [employer]. All efforts have been taken to prevent abuse potential and misuse of opioid medications including education, screening, and close clinical follow up.

## 2020-10-20 NOTE — H&P
I have reviewed the history and physical and examined the patient and find no relevant changes. I have reviewed with the patient and/or family the risks, benefits, and alternatives to the procedure. Patient being given increased dosage/quantity of opoid pain medication in excess of OSMB guidelines which noted a 30 MED daily of opioids due to the fact that he/she has undergone major orthopaedic surgery as outlined in rule 4731-11-13. Dosages and further duration of the pain medication will be re-evaluated at her post op visit in 2 weeks. Patient was educated on dosing expectations and limits of prescribing as a result of the new [facility] Board rules enacted August 31, 2017. Please also note that this is not the initial opoid prescription issued to this patient but a continuation of medication utilized during the hospital admission as noted in the medical record. OARRS report has also been utilized to screen for any abuse history or suspicious activity as outlined in [place]. All efforts have been taken to prevent abuse potential and misuse of opioid medications including education, screening, and close clinical follow up. Controlled Substance Monitoring:    Acute and Chronic Pain Monitoring:   RX Monitoring 10/20/2020   Periodic Controlled Substance Monitoring No signs of potential drug abuse or diversion identified.

## 2020-10-20 NOTE — PROGRESS NOTES
Occupational Therapy   Occupational Therapy Initial Assessment/Treatment  Date: 10/20/2020   Patient Name: Jessie Welch  MRN: [id number]     : 1975    Date of Service: 10/20/2020    Discharge Recommendations:  24 hour supervision or assist       Assessment   Performance deficits / Impairments: Decreased functional mobility ; Decreased ADL status; Decreased balance  After evaluation POD #0 s/p L ant.lateral THR, pt found to be presenting with the above mentioned occupational performance deficits which are affecting participation in daily living skills. Upon intial assessment patient with poor quad control and unable to safe get OOB, 2nd trial patient with poor LLE quad control and patient kept LLE NWB for mobility in/out of bathroom with RW. Pt was hypotensive once up to chair, required use of stedy to return to bed d/t unresponsiveness. RN present at end of session and aware of 2 episodes of unresponsiveness. Pt would benefit from continued skilled occupational therapy to address ADLs, functional mobility, and safety while in acute care. Prognosis: Good  Decision Making: Low Complexity  OT Education: OT Role;Plan of Care;Precautions; ADL Adaptive Strategies;Transfer Training;Equipment; Family Education  Patient Education: Disease specific:  REQUIRES OT FOLLOW UP: Yes  Activity Tolerance  Activity Tolerance: Patient Tolerated treatment well;Treatment limited secondary to medical complications (free text)  Activity Tolerance: Patient hypotensive during 2nd session. Pt Bp dropped to 69/47, returned to bed at 86/55 and Bp now 1047/65. RN aware. Jania cloud used to transfer chair to bed d/t unresponsiveness. Safety Devices  Safety Devices in place: Yes  Type of devices: Nurse notified; Bed alarm in place; Left in bed;Gait belt;Call light within reach           Patient Diagnosis(es): The primary encounter diagnosis was Status post total replacement of left hip.  A diagnosis of Primary osteoarthritis of left hip was also pertinent to this visit. has no past medical history on file. has no past surgical history on file. Restrictions  Restrictions/Precautions  Restrictions/Precautions: Weight Bearing, ROM Restrictions, Fall Risk, Surgical Protocols  Lower Extremity Weight Bearing Restrictions  Left Lower Extremity Weight Bearing: Partial Weight Bearing  Partial Weight Bearing Percentage Or Pounds: 50%  Position Activity Restriction  Hip Precautions: No hip external rotation, No hip extension, No ADduction    Subjective   General  Chart Reviewed: Yes  Patient assessed for rehabilitation services?: Yes  Family / Caregiver Present: Yes(spouse)  Referring Practitioner: Dr. Celine Pinzon  Diagnosis: L hip OA, s/p L TKR 10/20/20  Subjective  Subjective: Pt pleasant and agreeable to OT evaluation, initially after interview patient LLE with poor quad control unable to get OOB. 2nd attempt patient able to \"hop\" into bathroom\"  Patient Currently in Pain: Yes  Pain Assessment  Pain Assessment: 0-10  Pain Level: 4  Pain Type: Surgical pain  Pain Location: Hip  Pain Orientation: Left  Pain Descriptors: Aching; Sore  Pain Frequency: Continuous  Non-Pharmaceutical Pain Intervention(s): Ambulation/Increased Activity;Repositioned;Cold applied; Emotional support  Social/Functional History  Social/Functional History  Lives With: Family(spouse and 3 kids)  Type of Home: House  Home Layout: Two level, Performs ADL's on one level, Able to Live on Main level with bedroom/bathroom  Home Access: Stairs to enter without rails  Entrance Stairs - Number of Steps: 1 ZACH  Bathroom Shower/Tub: Walk-in shower  Bathroom Toilet: Handicap height  Bathroom Equipment: Built-in shower seat, Shower chair  Home Equipment: Rolling walker  ADL Assistance: Independent  Homemaking Assistance: Independent  Homemaking Responsibilities: Yes  Ambulation Assistance: Independent  Transfer Assistance: Independent  Active : Yes  Occupation: Full time employment  Type of occupation: accounting  Leisure & Hobbies: road biking       Objective   Vision: Impaired  Vision Exceptions: Wears glasses at all times  Hearing: Exceptions to [facility]  Hearing Exceptions: Hard of hearing/hearing concerns(L worse than R)    Orientation  Overall Orientation Status: Within Functional Limits     Balance  Sitting Balance: Stand by assistance  Standing Balance: Minimal assistance(with RW, pt with poor LLE quad control mantatined a NWB status on LLE)    Functional Mobility  Functional - Mobility Device: Rolling Walker  Activity: To/from bathroom  Assist Level: Minimal assistance  Functional Mobility Comments: patient with poor quad control in surgical leg (LLE) pt ambulated with NWB on LLE for safety. Toilet Transfers  Toilet - Technique: Ambulating(with RW)  Equipment Used: Standard toilet  Toilet Transfer: Minimal assistance(pt with posterior LOB as he was attempting to flush toilet)  Toilet Transfers Comments: educated pt on safety d/t LLE weakness and LOB.      ADL  Feeding: Setup  LE Dressing: Maximum assistance  Toileting: Stand by assistance     Tone RUE  RUE Tone: Normotonic  Tone LUE  LUE Tone: Normotonic  Coordination  Movements Are Fluid And Coordinated: Yes     Bed mobility  Supine to Sit: Stand by assistance  Sit to Supine: 2 Person assistance;Maximum assistance(d/t unresponsiveness)  Transfers  Sit to stand: Contact guard assistance;Minimal assistance(RW)  Stand to sit: 2 Person assistance;Maximum assistance(d/t unresponsiveness)     Cognition  Overall Cognitive Status: WFL        LUE AROM (degrees)  LUE AROM : WFL  RUE AROM (degrees)  RUE AROM : WFL  LUE Strength  Gross LUE Strength: WFL  RUE Strength  Gross RUE Strength: WFL      Plan   Plan  Times per week: 4-6x's a week while in acute care             AM-PAC Score        AM-PAC Inpatient Daily Activity Raw Score: 18 (10/20/20 1655)  AM-PAC Inpatient ADL T-Scale Score : 38.66 (10/20/20 1655)  ADL Inpatient CMS 0-100% Score: 46.65 (10/20/20 1655)  ADL Inpatient CMS G-Code Modifier : CK (10/20/20 1655)    Goals  Short term goals  Time Frame for Short term goals: 3 days  Short term goal 1: Pt will complete LE dressing with SBA  Short term goal 2: Pt will complete toilet trnasfers with SBA  Short term goal 3: Pt will verbalize understanding of safe car transfers after education by 10/21  Patient Goals   Patient goals : \"to be able to get on/off toilet without falling over and without your help\"       Therapy Time   Individual Concurrent Group Co-treatment   Time In 1149(400pm)         Time Out 1215(420p)         Minutes 26         Timed Code Treatment Minutes: 26 Minutes(10 eval; 26 tx)       Jorge Olivo OTR/L  If pt is unable to be seen after this session, please let this note serve as discharge summary. Please see case management note for discharge disposition. Thank you.

## 2020-10-21 ENCOUNTER — TELEPHONE (OUTPATIENT)
Dept: ORTHOPEDIC SURGERY | Age: 45
End: 2020-10-21

## 2020-10-21 VITALS
WEIGHT: 202 LBS | HEIGHT: 68 IN | RESPIRATION RATE: 16 BRPM | HEART RATE: 86 BPM | TEMPERATURE: 98 F | SYSTOLIC BLOOD PRESSURE: 100 MMHG | BODY MASS INDEX: 30.62 KG/M2 | OXYGEN SATURATION: 98 % | DIASTOLIC BLOOD PRESSURE: 68 MMHG

## 2020-10-21 LAB
ANION GAP SERPL CALCULATED.3IONS-SCNC: 11 MMOL/L (ref 3–16)
BUN BLDV-MCNC: 20 MG/DL (ref 7–20)
CALCIUM SERPL-MCNC: 8.6 MG/DL (ref 8.3–10.6)
CHLORIDE BLD-SCNC: 102 MMOL/L (ref 99–110)
CO2: 24 MMOL/L (ref 21–32)
CREAT SERPL-MCNC: 1 MG/DL (ref 0.9–1.3)
GFR AFRICAN AMERICAN: >60
GFR NON-AFRICAN AMERICAN: >60
GLUCOSE BLD-MCNC: 112 MG/DL (ref 70–99)
HCT VFR BLD CALC: 36.3 % (ref 40.5–52.5)
HEMOGLOBIN: 12.6 G/DL (ref 13.5–17.5)
MCH RBC QN AUTO: 29.9 PG (ref 26–34)
MCHC RBC AUTO-ENTMCNC: 34.8 G/DL (ref 31–36)
MCV RBC AUTO: 85.9 FL (ref 80–100)
PDW BLD-RTO: 12.6 % (ref 12.4–15.4)
PLATELET # BLD: 248 K/UL (ref 135–450)
PMV BLD AUTO: 7.4 FL (ref 5–10.5)
POTASSIUM REFLEX MAGNESIUM: 4.4 MMOL/L (ref 3.5–5.1)
RBC # BLD: 4.23 M/UL (ref 4.2–5.9)
SODIUM BLD-SCNC: 137 MMOL/L (ref 136–145)
WBC # BLD: 10.1 K/UL (ref 4–11)

## 2020-10-21 PROCEDURE — 80048 BASIC METABOLIC PNL TOTAL CA: CPT

## 2020-10-21 PROCEDURE — 85027 COMPLETE CBC AUTOMATED: CPT

## 2020-10-21 PROCEDURE — 36415 COLL VENOUS BLD VENIPUNCTURE: CPT

## 2020-10-21 PROCEDURE — 97116 GAIT TRAINING THERAPY: CPT

## 2020-10-21 PROCEDURE — 6370000000 HC RX 637 (ALT 250 FOR IP): Performed by: PHYSICIAN ASSISTANT

## 2020-10-21 PROCEDURE — 97110 THERAPEUTIC EXERCISES: CPT

## 2020-10-21 PROCEDURE — 2580000003 HC RX 258: Performed by: PHYSICIAN ASSISTANT

## 2020-10-21 RX ORDER — SENNA AND DOCUSATE SODIUM 50; 8.6 MG/1; MG/1
1 TABLET, FILM COATED ORAL 2 TIMES DAILY
COMMUNITY
Start: 2020-10-21

## 2020-10-21 RX ADMIN — ACETAMINOPHEN 650 MG: 325 TABLET ORAL at 05:30

## 2020-10-21 RX ADMIN — SODIUM CHLORIDE: 9 INJECTION, SOLUTION INTRAVENOUS at 05:28

## 2020-10-21 RX ADMIN — OXYCODONE 5 MG: 5 TABLET ORAL at 14:17

## 2020-10-21 RX ADMIN — APIXABAN 2.5 MG: 2.5 TABLET, FILM COATED ORAL at 08:53

## 2020-10-21 RX ADMIN — ACETAMINOPHEN 650 MG: 325 TABLET ORAL at 08:53

## 2020-10-21 RX ADMIN — DOCUSATE SODIUM 50 MG AND SENNOSIDES 8.6 MG 1 TABLET: 8.6; 5 TABLET, FILM COATED ORAL at 08:53

## 2020-10-21 RX ADMIN — OXYCODONE 5 MG: 5 TABLET ORAL at 06:46

## 2020-10-21 ASSESSMENT — PAIN DESCRIPTION - FREQUENCY: FREQUENCY: CONTINUOUS

## 2020-10-21 ASSESSMENT — PAIN SCALES - GENERAL
PAINLEVEL_OUTOF10: 1
PAINLEVEL_OUTOF10: 3
PAINLEVEL_OUTOF10: 0
PAINLEVEL_OUTOF10: 5
PAINLEVEL_OUTOF10: 0
PAINLEVEL_OUTOF10: 4

## 2020-10-21 ASSESSMENT — PAIN DESCRIPTION - ORIENTATION
ORIENTATION: LEFT
ORIENTATION: LEFT

## 2020-10-21 ASSESSMENT — PAIN DESCRIPTION - LOCATION
LOCATION: HIP
LOCATION: HIP

## 2020-10-21 ASSESSMENT — PAIN DESCRIPTION - PAIN TYPE
TYPE: SURGICAL PAIN
TYPE: ACUTE PAIN;SURGICAL PAIN

## 2020-10-21 ASSESSMENT — PAIN DESCRIPTION - DESCRIPTORS: DESCRIPTORS: TIGHTNESS

## 2020-10-21 NOTE — PROGRESS NOTES
Physical Therapy  Facility/Department: [facility] C5 - MED SURG/ORTHO  Daily Treatment Note  NAME: Valentin Vasquez  : 1975  MRN: [id number]    Date of Service: 10/21/2020    Discharge Recommendations:  Home with assist PRN, Home with Home health PT   PT Equipment Recommendations  Equipment Needed: No   If pt is unable to be seen after this session, please let this note serve as discharge summary. Please see case management note for discharge disposition. Thank you. Assessment   Body structures, Functions, Activity limitations: Decreased functional mobility ; Decreased strength;Decreased balance; Increased pain  Assessment: Pt tolerated session well without c/o. Pain 1/10, ambulated 150' with supervision and RW, up and down curb step with RW and SBA. Pt demos good understanding of exs and hip precaution. PT plans D/C home later today with family support. Treatment Diagnosis: Decreased ROM/strength LLE and (I) with mobility s/p L THR  Specific instructions for Next Treatment: Progress ther ex and mobility as tolerated, assess curb step navigation at next session  Prognosis: Good  Decision Making: Low Complexity  PT Education: Goals; General Safety;Gait Training;PT Role;Plan of Care;Family Education; Functional Mobility Training;Disease Specific Education;Home Exercise Program;Precautions;Transfer Training;Weight-bearing Education;Equipment  Patient Education: Disease-specific education: Pt and pt's wife educated on HEP for L hip ROM/strengthening and in transfer/gait strategies with use of RW while 50% WB LLE; pt and wife verbalize understanding. REQUIRES PT FOLLOW UP: Yes  Activity Tolerance  Activity Tolerance: Patient Tolerated treatment well     Patient Diagnosis(es): The primary encounter diagnosis was Status post total replacement of left hip. A diagnosis of Primary osteoarthritis of left hip was also pertinent to this visit. has a past medical history of Arthritis.    has a past surgical history that includes Total hip arthroplasty (Left, 10/20/2020). Restrictions  Restrictions/Precautions  Restrictions/Precautions: Weight Bearing, ROM Restrictions, Fall Risk, Surgical Protocols  Lower Extremity Weight Bearing Restrictions  Left Lower Extremity Weight Bearing: Partial Weight Bearing  Partial Weight Bearing Percentage Or Pounds: 50%  Position Activity Restriction  Hip Precautions: No hip external rotation, No hip extension, No ADduction  Other position/activity restrictions: IV lines     Subjective   General  Chart Reviewed: Yes  Response To Previous Treatment: Patient with no complaints from previous session. Family / Caregiver Present: Yes(wife)  Referring Practitioner: Dr. Alyssa Beal and NORM Goldstein  Subjective  Subjective: Pt agreeable to work with PT  Pain Screening  Patient Currently in Pain: Yes  Pain Assessment  Pain Assessment: 0-10  Pain Level: 1  Pain Type: Acute pain;Surgical pain  Pain Location: Hip  Pain Orientation: Left  Vital Signs  Patient Currently in Pain: Yes          Objective   Bed mobility  Supine to Sit: Unable to assess  Sit to Supine: Unable to assess  Transfers  Sit to Stand: Supervision  Stand to sit: Supervision  Ambulation  Ambulation?: Yes  WB Status: 50% WB LLE  Ambulation 1  Surface: level tile  Device: Rolling Walker  Assistance: Supervision;Modified Independent  Gait Deviations: Slow Sarah Beth;Decreased step length  Distance: 150'  Comments: Pt without c/o dizziness during amb.   Stairs/Curb  Stairs?: Yes  Stairs  Curbs: 6\"  Device: Standard walker  Assistance: Stand by assistance;Supervision        Exercises  Quad Sets: x 15 BLE  Heelslides: x 15 LLE (I) seated  Gluteal Sets: x 15 bilat  Knee Short Arc Quad: -  Knee Active Range of Motion: X 15 LLE  Ankle Pumps: x 15 BLE  Comments: Pt recalls 2/3 hip precautions, reviewed for understanding        AM-PAC Score  AM-PAC Inpatient Mobility Raw Score : 23 (10/21/20 8057)  AM-PAC Inpatient T-Scale Score : 56.93 (10/21/20 1424)  Mobility Inpatient CMS 0-100% Score: 11.2 (10/21/20 1424)  Mobility Inpatient CMS G-Code Modifier : CI (10/21/20 1424)          Goals  Short term goals  Time Frame for Short term goals: 1 day, 10/21/20  Short term goal 1: Pt will transfer supine <-> sit with modified(I) - MET 10/21/20  Short term goal 2: Pt will transfer sit <-> stand and bed>chair using RW with modified(I); 10/21 KIANA  Short term goal 3: Pt will ambulate x 200 feet using RW with supervision; 10/21 amb 150' supervision with RW  Short term goal 4: Pt will tolerate 12-15 reps LLE ther ex for ROM/strengthening - MET 10/21/20  Short term goal 5: Pt will ambulate up/down 6\" curb step using RW with SBA; 10/21 met  Patient Goals   Patient goals : \"To be able to walk around the house (distances of ~50 feet) using my walker without help from my wife (supervision)\" by 10/21/20    Plan    Plan  Times per week: BID 7x/week  Times per day: Twice a day  Specific instructions for Next Treatment: Progress ther ex and mobility as tolerated, assess curb step navigation at next session  Current Treatment Recommendations: Strengthening, ROM, Functional Mobility Training, Transfer Training, Gait Training, Stair training, Home Exercise Program, Safety Education & Training, Patient/Caregiver Education & Training, Equipment Evaluation, Education, & procurement  Safety Devices  Type of devices:  All fall risk precautions in place, Call light within reach, Chair alarm in place, Gait belt, Left in chair, Nurse notified     Therapy Time   Individual Concurrent Group Co-treatment   Time In 1310         Time Out 1340         Minutes 30         Timed Code Treatment Minutes: [address] [facility]

## 2020-10-21 NOTE — OP NOTE
[facility] [address], [place] [address]                                OPERATIVE REPORT    PATIENT NAME: Lilliam Lopes                 :        1975  MED REC NO:   [id number]                          ROOM:       [id number]  ACCOUNT NO:   [de-identified]                           ADMIT DATE: 10/20/2020  PROVIDER:     Jackee Romberg, MD    DATE OF PROCEDURE:  10/20/2020    SERVICE:  Orthopedic Surgery. SURGEON:  Jade Iraheta MD    FIRST ASSISTANT:  NORM Malin    SECOND ASSISTANT:  Hussain Alicia SA    PREOPERATIVE DIAGNOSIS:  Severe osteoarthritis of the left hip (715.35). POSTOPERATIVE DIAGNOSIS:  Severe osteoarthritis of the left hip  (715.35). OPERATIVE PROCEDURE:  Left Smith and Nephew total hip replacement using  52-mm outer diameter R3 cup and two 20-mm screws, _____ 20-degree offset  liner, size 13 Synergy high offset femoral stem with a -3 femoral head,  Oxinium. TOURNIQUET:  Not applicable. DRAINS:  None. ESTIMATED BLOOD LOSS:  250 mL. COMPLICATIONS:  None. POSITION:  Lateral decubitus position in contralateral hip from surgery  with the Vac-Dylon, superficial bony prominences carefully padded,  axillary roll. X-RAYS:  None. ANTIBIOTICS:  Per SCIP protocol based upon patient's age, weight, and  allergies. SPECIAL PROCEDURES:  The patient had Cell Saver and platelet gel  utilized throughout the procedure. Platelet gel was utilized on the  uncemented placement of the components as well as on the layered  closure. DISPOSITION:  To the recovery room. INDICATIONS:  He is 53years old. The patient's history is well  documented in the records from [facility]. The patient has  failed nonoperative measures with respect to the osteoarthritic hip  regarding patient's pain and functional capabilities.     There has been a lengthy discussion with the patient regarding the  risks, [facility] ICU Progress Note Name: Yamila Mahajan : 1964 MRN: [id number] Date: 2018 [x]I have reviewed the flowsheet and previous days notes. Events overnight reviewed and discussed with nursing staff. Vital signs and records reviewed. Arabella Barros a 52 y. o. female c PMH of HTN, HLD, ESRD on HD who presented to the [facility] ER on 18 with left-sided weakness and nausea/vomiting that started three days prior. Pt speaks [place] only. Per Chart review, son stated that the pt has been lethargic, barely able to move with unilateral weakness. Pt was recently admitted to [facility] with similar symptoms; in ED, pt was found to be in Hypertensive Emergency; started on Cardene gtt; transferred to ICU for further care/management. Prior CT scan reported massive pericardial effusion on 18. Echo on 18 Small circumferential pericardial effusion with more prominent area around the right artium. No indications of tamponade present 
- POC echo 18 with no wall motion abnormalities, small pericardial effusion without tamponade physiology, LVH Subjective: 
18 
- No new events overnight. - Remains on Cardene gtt, continue to wean off. - Able to take PO medications this AM with water - Con't mechanical soft solids/ think liquids. - Afebrile; aleukocytosis; good UOP 
- Pt able to follow simple commands; no complaints. ROS:A comprehensive review of systems was negative except for that written in the HPI. Vital Signs:   
Visit Vitals  /72  Pulse 79  Temp 98.3 °F (36.8 °C)  Resp 17  Ht 5' 1\" (1.549 m)  Wt 35 kg (77 lb 2.6 oz)  SpO2 99%  Breastfeeding No  
 BMI 14.58 kg/m2 O2 Device: Room air Temp (24hrs), Av.2 °F (36.8 °C), Min:97.4 °F (36.3 °C), Max:98.8 °F (37.1 °C) Intake/Output:  
Last shift:       07 -  1900 In: 120 [P.O.:120] Out: - Last 3 shifts: 08/26 1901 - 08/28 0700 In: 954.1 [I.V.:364.1] Out: 3500 Intake/Output Summary (Last 24 hours) at 08/28/18 1457 Last data filed at 08/28/18 0830 Gross per 24 hour Intake           458.24 ml Output                0 ml Net           458.24 ml Physical Exam: 
 
General: awake, alert; follows simple commands HEENT:  Anicteric sclerae; pink palpebral conjunctivae; oral mucosa moist 
Resp:  Symmetrical chest expansion, no accessory muscle use; good AE bilat; CTAB; no rales/ wheezing/ rhonchi noted CV:  S1, S2 present GI:  Abdomen soft, non-tender; (+) active bowel sounds, NGT Extremities:  +2 pulses on all extremities; no edema/ cyanosis/ clubbing noted; normal capillary refill Skin:  Warm; no rashes/ lesions noted Neurologic:  Non-focal; follows commands; family to translate as pt does not speak english. DATA:  
 
Current Facility-Administered Medications Medication Dose Route Frequency  minoxidil (LONITEN) tablet 2.5 mg  2.5 mg Oral BID  rifAMPin (RIFADIN) capsule 600 mg  600 mg Oral ACD  niCARdipine (CARDENE) 25 mg in 0.9% sodium chloride 250 mL infusion  0-15 mg/hr IntraVENous TITRATE  aspirin chewable tablet 81 mg  81 mg Oral DAILY  ondansetron (ZOFRAN) injection 6 mg  6 mg IntraVENous Q6H PRN  
 losartan (COZAAR) tablet 100 mg  100 mg Oral DAILY  metoclopramide HCl (REGLAN) injection 5 mg  5 mg IntraVENous BID  melatonin tablet 3 mg  3 mg Oral QHS  acetaminophen (TYLENOL) solution 500 mg  500 mg Oral Q4H PRN  polyethylene glycol (MIRALAX) packet 17 g  17 g Oral DAILY  labetalol (NORMODYNE;TRANDATE) 20 mg/4 mL (5 mg/mL) injection 10 mg  10 mg IntraVENous Q6H PRN  
 isoniazid (NYDRAZID) tablet 300 mg  300 mg Oral DAILY  atorvastatin (LIPITOR) tablet 40 mg  40 mg Oral QHS  bisacodyl (DULCOLAX) tablet 5 mg  5 mg Oral DAILY PRN  
 docusate sodium (COLACE) capsule 100 mg  100 mg Oral BID  
 benefits, and potential complications of the operation as well as  a normal rehabilitative protocol. The patient specifically voiced  understanding to concerns regarding surgical infection, deep vein  thrombosis, dystrophy, arthrofibrosis, delayed rehabilitation,  dislocation of the prosthesis, periprosthetic fracture, neurologic  injury, etc.  We also talked about what would happen if the patient were  to undergo a hip dislocation and potential treatment in that regard. The patient voiced understanding to all of these concerns and elected to  have the procedure done. All questions were answered. DETAILS OF SURGERY:  The patient was seen in the preanesthesia holding  area by me, and I personally initialed the operative site. Any further  questions were also answered. The patient was then taken to the  operating room where anesthesia was induced and maintained by anesthesia  personnel. This was all documented in their records from [facility]. After the patient was positioned as described above, the hip was prepped  and draped with ChloraPrep. A longitudinal incision was made through the skin and carried down to  expose the tensor fascia lucia and proximal iliotibial band. Longitudinal incision was made through these tissues. The Charnley  retractor was placed and the abductor mechanism was easily isolated. Any redundant greater trochanter bursa was removed to specifically  delineate the anatomy of the abductor mechanism. A bayonet release was  performed of the abductors, sparing the tendinous portion posteriorly. The abductor musculature was then reflected off the anterior capsule of  the hip joint. Any bleeding encountered was controlled with a Bovie. With the hip capsule identified, a capsulotomy was performed and carried  up onto the edge of the acetabulum.   A preliminary labral removal was  performed and, of course, dissection was carried along the medial side  of the femoral neck as well. The hip was then dislocated without difficulty and the preliminary cut  was made in the femoral neck. At this point, the acetabulum was addressed. Hohmann retractors were  placed circumferentially around the acetabulum and a wing retractor was  placed proximally. The acetabular labrum was removed in its entirety. The fovea centralis was then cleaned. Any bleeding encountered was  controlled once again with the Bovie. A soft tissue release was  performed inferiorly due to the tight capsular structures. Once the acetabulum was circumferentially isolated, a serial reaming was  performed to 1-mm smaller than the aforementioned size. This gave an  excellent fit circumferentially and the trial component was placed. This was positioned in approximately 20 degrees of anteversion and 45  degrees of abduction. I was very happy with the overall bleeding bed. The platelet gel was then placed into this bleeding bed and the  acetabular component was placed. Excellent fit was obtained. The liner  was then locked into position protecting against superior and posterior  dislocation. This was all carefully checked with the tonsil. Attention was then drawn toward the femur once again. The cookie cutter  was utilized to appropriately lateralize the opening. The awls were  used and then the broach was taken of the patient up to the  aforementioned size. A trial reduction was performed and gave excellent  leg length and leg stability. The patient did not demonstrate any signs  of dislocation with 90 degrees of abduction and 50 degrees of internal  and external rotation as well as full extension and 50 degrees of  internal and external rotation. The permanent femoral component was then placed and gave the same  excellent alignment and stability. Again, platelet gel was utilized on  the prepped surface for the component placement.     Everything else was assessed once again after the  ethambutol (MYAMBUTOL) tablet 800 mg  800 mg Oral Q TUE, THU & SAT  pyrazinamide tablet 1,000 mg  1,000 mg Oral Q TUE, THU & SAT  pyridoxine (vitamin B6) (VITAMIN B-6) tablet 100 mg  100 mg Oral DAILY  sodium chloride (NS) flush 5-10 mL  5-10 mL IntraVENous Q8H  
 sodium chloride (NS) flush 5-10 mL  5-10 mL IntraVENous PRN  
 heparin (porcine) injection 5,000 Units  5,000 Units SubCUTAneous Q8H  
 hydrALAZINE (APRESOLINE) tablet 100 mg  100 mg Oral TID  carvedilol (COREG) tablet 25 mg  25 mg Oral Q12H  
 amLODIPine (NORVASC) tablet 10 mg  10 mg Oral DAILY  albuterol (PROVENTIL VENTOLIN) nebulizer solution 2.5 mg  2.5 mg Nebulization Q4H PRN  
 cloNIDine (CATAPRES) 0.3 mg/24 hr patch 1 Patch  1 Patch TransDERmal Q7D Labs: Results:  
   
Chemistry Recent Labs  
   08/28/18 
 0406  08/27/18 
 8740  08/26/18 
 0234 GLU  89  121*  88  
NA  138  134*  138  
K  3.5  3.5  3.7 CL  104  99*  102 CO2  25  29  29 BUN  13  29*  11  
CREA  2.36*  3.66*  1.97* CA  9.2  8.0*  9.0 AGAP  9  6  7 BUCR  6*  8*  6* AP  126*  173*  147* TP  6.1*  5.2*  6.0* ALB  2.7*  2.3*  2.8*  
GLOB  3.4  2.9  3.2 AGRAT  0.8  0.8  0.9  
  
CBC w/Diff Recent Labs  
   08/28/18 
 0406  08/27/18 
 0258  08/26/18 
 0234 WBC  3.5*  2.9*  3.9*  
RBC  2.99*  2.81*  3.20* HGB  9.4*  9.1*  10.1* HCT  27.4*  26.1*  29.8* PLT  234  170  146 GRANS  58  49  63 LYMPH  14*  18*  15* EOS  8*  9*  4 Coagulation No results for input(s): PTP, INR, APTT in the last 72 hours. No lab exists for component: INREXT, INREXT Liver Enzymes Recent Labs  
   08/28/18 
 0406 TP  6.1* ALB  2.7* AP  126* SGOT  35 ABG No results found for: PH, PHI, PCO2, PCO2I, PO2, PO2I, HCO3, HCO3I, FIO2, FIO2I Microbiology No results for input(s): CULT in the last 72 hours. Telemetry: [x]Sinus []A-flutter []Paced []A-fib []Multiple PVCs Imaging: No new imaging (08/28/18) US ABD LTD [08/27/18]: IMPRESSION:  
1. Phasic portal vein flow is suggestive of right heart dysfunction. 2.  Small volume fluid adjacent to the gallbladder with generalized ascites on recent CT. No convincing evidence of cholecystitis. 3.  Unchanged subcentimeter liver lesion, possibly hemangioma but too small to definitively characterize with MR or CT. consider 1 year follow-up ultrasound to document stability. 4.  Right pleural effusion. IMPRESSION:  
· Hypertensive emergency with response to Cardene and intermittent hemodialysis -gradually improving · Left sided weakness with mild left facial droop due to hypertensive encephalopathy · Chronic nausea, vomiting and abdominal pain, likely related to chronic constipation and poorly controlled BP, ? TB drugs- KUB and CT nondiagnostic. 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 
· Oropharyngeal dysphagia -cleared by SLP for mechanical soft solids · History of seizures PLAN:  
· CVS - BP better controlled this AM; titrate off Cardene drip as able; Con't coreg, clonidine, hydralazine, losartan. May restart nicardipine and titrate to SBP < 180. Renal service adding minoxidil for BP control. Continue aspirin · Resp - stable on room air. May have oxygen supplementation as needed to  to maintain SpO2 > 92%. Strict aspiration precautions. · ID - Continue TB regimen: Rifampin + Ethambutol +INH+ pyrazinamide · Heme/onc - Daily CBC; monitor H/H & plts. Stable normocytic anemia, most likely in the setting of CKD · Metabolic - Daily BMP; monitor e-lytes; replace per nephrology during dialysis · Renal - Trend renal indices; IHD per nephrology · Endocrine - Glycemic control goal <180; avoid hypoglycemia. Sick euthroid syndrome · Neuro/ Pain/ Sedation - Avoid sedating medications hip was reduced. There were no signs of impingement. Any redundant osteophyte tissue had  been removed at this point. Overall tissue tension was appropriate. The wound was then closed in layered fashion with No. 2 Ethibond in the  capsule and abductor mechanism. Each layer was sprayed with platelet  gel. The patient's wound was closed completely and the patient was  placed on abduction pillow postoperatively with the leg length and leg  position appearing to be appropriate. The patient was then transported  to the recovery room uneventfully.         Michael Acosta MD    D: 10/20/2020 10:54:30       T: 10/20/2020 19:48:18     [email or web address]  Job#: [id number]     Doc#: [id number]    CC: · GI - Con't mechanical soft diet. Family is feeding pt meals from home and is aware of diet restrictions. Continue with bowel regimen (scheduled colace, reglan, miralax). Strict aspiration precautions · Prophylaxis - DVT (SQH) GI- does not require GI prophylaxis · Discussed in interdisciplinary rounds - Pt is otherwise stable; will be able to transfer out of unit as soon as she is off Cardene drip. Sierra Allen PA-C Pulmonary Critical Care Medicine [facility] [[email or web address]] [facility] [[email or web address]] [facility] Staff Addendum I have independently evaluated the patient and reviewed the patient's chart. I have discussed the findings and care plan with ICU Care Team. Care Plan reviewed on ICU milti-D rounds. I agree with the above evaluation, assessment and recommendations along with the following comments and observations. Continuing to address patient's difficult to control hypertension. If able to stay off IV drips can possibly transfer out of ICU later today. Critical Care and time spent coordinating care, minus procedure time: 25 min Kathryn Vázquez DO, [facility], Sleep and Critical Care Medicine 4:57 PM

## 2020-10-21 NOTE — CARE COORDINATION
[facility]    DC order noted, all docs needed have been faxed to [facility] for home care services.     Home care to see patient within 24-48 hrs    Yasmine Almaguer RN, BSN CTN  [facility] [phone]

## 2020-10-21 NOTE — DISCHARGE INSTR - COC
8\" (1.727 m)   Wt 202 lb (91.6 kg)   SpO2 98%   BMI 30.71 kg/m²     Last documented pain score (0-10 scale): Pain Level: 5  Last Weight:   Wt Readings from Last 1 Encounters:   10/20/20 202 lb (91.6 kg)     Mental Status:  {IP PT MENTAL STATUS:}    IV Access:  { LITO IV ACCESS:884447130}    Nursing Mobility/ADLs:  Walking   {CHP DME OQDQ:900694567}  Transfer  {CHP DME ZJND:364970869}  Bathing  {CHP DME AAZL:344784738}  Dressing  {CHP DME WMOW:242186286}  Toileting  {CHP DME RBCH:611309499}  Feeding  {CHP DME ZGPN:943108746}  Med Admin  {CHP DME SWGW:316412514}  Med Delivery   { LITO MED Delivery:744292900}    Wound Care Documentation and Therapy:        Elimination:  Continence:   · Bowel: {YES / YP:35987}  · Bladder: {YES / KY:74029}  Urinary Catheter: {Urinary Catheter:849043505}   Colostomy/Ileostomy/Ileal Conduit: {YES / YM:57187}       Date of Last BM: ***    Intake/Output Summary (Last 24 hours) at 10/21/2020 1140  Last data filed at 10/21/2020 0647  Gross per 24 hour   Intake 1737.5 ml   Output 1800 ml   Net -62.5 ml     I/O last 3 completed shifts: In: 2737.5 [P.O.:840;  I.V.:1897.5]  Out:  [Urine:1800; Blood:200]    Safety Concerns:     [address] [employer] Safety Concerns:868022203}    Impairments/Disabilities:      [address] [employer] Impairments/Disabilities:979884091}    Nutrition Therapy:  Current Nutrition Therapy:   [address] [employer] Diet List:529383706}    Routes of Feeding: {CHP DME Other Feedings:339523771}  Liquids: {Slp liquid thickness:15762}  Daily Fluid Restriction: {CHP DME Yes amt example:011597754}  Last Modified Barium Swallow with Video (Video Swallowing Test): {Done Not Done IZHA:131318886}    Treatments at the Time of Hospital Discharge:   Respiratory Treatments: ***  Oxygen Therapy:  {Therapy; copd oxygen:26835}  Ventilator:    {FORTUNATO MUÑOZ Vent HGOS:601516357}    Rehab Therapies: {THERAPEUTIC INTERVENTION:1380669725}  Weight Bearing Status/Restrictions: {FORTUNATO MUÑOZ Weight Bearin}  Other Medical Equipment

## 2020-10-21 NOTE — DISCHARGE SUMMARY
Department of Orthopedic Surgery  Nurse Practitioner   Discharge Summary      The Roxane Yepez is a 53 y.o. male underwent total hip replacement procedure without complication. Roxane Yepez was admitted to the floor following their recovery in the PACU. Discharge Diagnosis  left Hip Replacement    Current Inpatient Medications    Current Facility-Administered Medications: 0.9 % sodium chloride infusion, , Intravenous, Continuous  sodium chloride flush 0.9 % injection 10 mL, 10 mL, Intravenous, 2 times per day  sodium chloride flush 0.9 % injection 10 mL, 10 mL, Intravenous, PRN  acetaminophen (TYLENOL) tablet 650 mg, 650 mg, Oral, Q6H  morphine (PF) injection 2 mg, 2 mg, Intravenous, Q2H PRN **OR** morphine (PF) injection 4 mg, 4 mg, Intravenous, Q2H PRN  sennosides-docusate sodium (SENOKOT-S) 8.6-50 MG tablet 1 tablet, 1 tablet, Oral, BID  magnesium hydroxide (MILK OF MAGNESIA) 400 MG/5ML suspension 30 mL, 30 mL, Oral, Daily PRN  oxyCODONE (ROXICODONE) immediate release tablet 5 mg, 5 mg, Oral, Q4H PRN **OR** oxyCODONE (ROXICODONE) immediate release tablet 10 mg, 10 mg, Oral, Q4H PRN  apixaban (ELIQUIS) tablet 2.5 mg, 2.5 mg, Oral, BID    Post-operatively the patients diet was advanced as tolerated and their incision was checked on POD #1. The incision is dressing in place, clean, dry and intact with no signs of infection. The patient remained neurovascularly intact in the lower extremity and had intact pulses distally. Patients calf remained soft and showed no evidence of DVT. The patient was able to move their leg and ankle/foot without any problems post-operatively. Physical therapy and occupational therapy were consulted and began working with the patient post-operatively. The patient progressed with PT/OT as would be expected and continued to improve through their stay.   The patients pain was initially controlled with IV medications but we were able to transition to oral pain medications soon after arrival to the floor and their pain remained under good control through their hospital stay. From a medical standpoint the patient remained stable and continued to have the medicine team follow throughout their stay. The patients dressing was changed/incision was checked on day of d/c. The patient will be discharged at this time to Home  with their current diet restrictions and will continue to follow the hip precautions outlined to them by us and PT/OT. Condition on Discharge: Stable    Plan  Return visit in 2 weeks. .  Patient was instructed on the use of pain medications, the signs and symptoms of infection, and was given our number to call should they have any questions or concerns following discharge. For opioid prescriptions given at discharge the following statement is provided for compliance with [employer] rules. Patient being given increased dosage/quantity of opoid pain medication in excess of [place] guidelines which noted a 30 MED daily of opioids due to the fact that he/she has undergone major orthopaedic surgery as outlined in rule 4731-11-13. Dosages and further duration of the pain medication will be re-evaluated at her post op visit in 2 weeks. Patient was educated on dosing expectations and limits of prescribing as a result of the new [facility] Board rules enacted August 31, 2017. Please also note that this is not the initial opoid prescription issued to this patient but a continuation of medication utilized during the hospital admission as noted in the medical record. [place]ARRS report has also been utilized to screen for any abuse history or suspicious activity as outlined in [place]. All efforts have been taken to prevent abuse potential and misuse of opioid medications including education, screening, and close clinical follow up.

## 2020-10-21 NOTE — PROGRESS NOTES
Physical Therapy  Facility/Department: [facility]5 - MED SURG/ORTHO  Daily Treatment Note  NAME: Guy Marrero  : 1975  MRN: [id number]    Date of Service: 10/21/2020    Discharge Recommendations:  Home with assist PRN, Home with Home health PT   PT Equipment Recommendations  Equipment Needed: No    Assessment   Body structures, Functions, Activity limitations: Decreased functional mobility ; Decreased strength;Decreased balance; Increased pain  Assessment: Pt participated well with PT this session, demonstrating improved activity tolerance with no s/s orthostatic hypotension with OOB activity. Pt demonstrates improved AROM into L knee extension and L quad strength (able to amb ~125 feet with RW), although still reports feeling numb in LLE. For this reason, deferred step navigation assessment for afternoon PT session. Anticipate pt being able to D/C home after afternoon PT session today, provided pt can safely navigate curb step. Treatment Diagnosis: Decreased ROM/strength LLE and (I) with mobility s/p L THR  Specific instructions for Next Treatment: Progress ther ex and mobility as tolerated, assess curb step navigation at next session  Prognosis: Good  Decision Making: Low Complexity  PT Education: Goals; General Safety;Gait Training;PT Role;Plan of Care;Family Education; Functional Mobility Training;Disease Specific Education;Home Exercise Program;Precautions;Transfer Training;Weight-bearing Education;Equipment  Patient Education: Disease-specific education: Pt and pt's wife educated on HEP for L hip ROM/strengthening and in transfer/gait strategies with use of RW while 50% WB LLE; pt and wife verbalize understanding. REQUIRES PT FOLLOW UP: Yes  Activity Tolerance: Patient Tolerated treatment well  Activity Tolerance: BP values:  Supine 120/72, seated /74, standing 100/68, after amb 124/73. Pt without c/o dizziness with activity this session.      Patient Diagnosis(es): The primary encounter diagnosis was Status post total replacement of left hip. A diagnosis of Primary osteoarthritis of left hip was also pertinent to this visit. has a past medical history of Arthritis. has a past surgical history that includes Total hip arthroplasty (Left, 10/20/2020). Restrictions  Restrictions/Precautions  Restrictions/Precautions: Weight Bearing, ROM Restrictions, Fall Risk, Surgical Protocols  Lower Extremity Weight Bearing Restrictions  Left Lower Extremity Weight Bearing: Partial Weight Bearing  Partial Weight Bearing Percentage Or Pounds: 50%  Position Activity Restriction  Hip Precautions: No hip external rotation, No hip extension, No ADduction  Other position/activity restrictions: IV lines  Subjective   General  Chart Reviewed: Yes  Response To Previous Treatment: Patient with no complaints from previous session. Family / Caregiver Present: Yes(wife)  Referring Practitioner: Dr. Radha Madsen and NORM Hutson  Subjective  Subjective: Pt agreeable to work with PT this morning. States he's still feeling a little numb in LLE but that he can actively his extend his knee now. Hopeful to be able to D/C home later today. General Comment  Comments: Pt resting in bed upon entry of PT  Pain Screening  Patient Currently in Pain: Yes  Pain Assessment  Pain Assessment: 0-10  Pain Level: 5  Pain Type: Surgical pain  Pain Location: Hip  Pain Orientation: Left  Pain Descriptors: Tightness  Pain Frequency: Continuous  Non-Pharmaceutical Pain Intervention(s): Ambulation/Increased Activity;Repositioned;Cold applied; Emotional support       Orientation  Orientation  Overall Orientation Status: Within Normal Limits    Objective   Bed mobility  Supine to Sit: Modified independent(moving toward L side, HOB elevated ~30 degrees)  Scooting: Modified independent(to scoot forward to EOB)     Transfers  Sit to Stand: Stand by assistance  Stand to sit: Stand by assistance  Bed to Chair: Contact guard assistance(using RW)     Ambulation  Ambulation?: Yes  WB Status: 50% WB LLE  Ambulation 1  Surface: level tile  Device: Rolling Walker  Assistance: Contact guard assistance  Quality of Gait: Pt amb with slow margaret compared to baseline with decreased stride length. Pt observed to bear less than 25% weight through LLE due to feelings of numbness and mild knee instability, although no obvious knee buckling noted during gait training. Gait Deviations: Slow Margaret;Decreased step length  Distance: x 125 feet  Comments: Pt without c/o dizziness during amb. Stairs/Curb  Stairs?: No(deferred until this afternoon due to c/o LLE numbness and mild quad weakness)     Balance  Posture: Good  Sitting - Static: Good  Sitting - Dynamic: Good  Standing - Static: Good;-(using RW)  Standing - Dynamic: Good;-(using RW)     Exercises  Quad Sets: x 15 BLE  Heelslides: x 15 LLE (I)  Gluteal Sets: x 15 bilat  Knee Short Arc Quad: x 15 LLE (I) - min cues needed for improved eccentric control  Ankle Pumps: x 15 BLE     AM-PAC Score  AM-PAC Inpatient Mobility Raw Score : 20 (10/21/20 1155)  AM-PAC Inpatient T-Scale Score : 47.67 (10/21/20 1155)  Mobility Inpatient CMS 0-100% Score: 35.83 (10/21/20 1155)  Mobility Inpatient CMS G-Code Modifier : CJ (10/21/20 1155)    Goals  Short term goals  Time Frame for Short term goals: 1 day, 10/21/20  Short term goal 1: Pt will transfer supine <-> sit with modified(I) - MET 10/21/20  Short term goal 2: Pt will transfer sit <-> stand and bed>chair using RW with modified(I)  Short term goal 3: Pt will ambulate x 200 feet using RW with supervision  Short term goal 4: Pt will tolerate 12-15 reps LLE ther ex for ROM/strengthening - MET 10/21/20  Short term goal 5: Pt will ambulate up/down 6\" curb step using RW with SBA  Patient Goals   Patient goals :  \"To be able to walk around the house (distances of ~50 feet) using my walker without help from my wife (supervision)\" by 10/21/20    Plan    Times per week: BID

## 2020-10-21 NOTE — PROGRESS NOTES
Department of Orthopedic Surgery  Nurse Practitioner   Progress Note    Subjective:     Post-Operative Day: 1 Status Post left Total Hip Arthroplasty  Systemic or Specific Complaints: Patient sitting up in bed. No complaints at this time. Patient with dizziness and syncopal episode yesterday, improved today. Block wearing off, quad function returning. Wife at bedside. Objective:     Patient Vitals for the past 24 hrs:   BP Temp Temp src Pulse Resp SpO2 Height Weight   10/21/20 0852 129/74 98 °F (36.7 °C) Oral 86 16 98 % -- --   10/21/20 0647 104/64 -- -- -- -- -- -- --   10/21/20 0301 116/62 98.2 °F (36.8 °C) Oral 88 16 98 % -- --   10/20/20 2319 -- -- -- -- -- -- 5' 8\" (1.727 m) 202 lb (91.6 kg)   10/20/20 2305 (!) 90/57 97.6 °F (36.4 °C) Oral 104 20 97 % -- --   10/20/20 1945 (!) 101/58 97.8 °F (36.6 °C) Oral 107 18 98 % -- --   10/20/20 1715 101/65 -- -- -- -- -- -- --   10/20/20 1645 112/69 98.9 °F (37.2 °C) -- 87 -- 98 % -- --   10/20/20 1630 (!) 86/55 -- -- -- -- -- -- --   10/20/20 1600 111/68 98.2 °F (36.8 °C) Oral 95 -- 97 % -- --   10/20/20 1331 110/65 97.3 °F (36.3 °C) Temporal 78 20 97 % -- --   10/20/20 1150 105/60 -- -- 80 23 96 % -- --       General: alert, appears stated age, cooperative and no distress   Wound: Wound clean and dry no evidence of infection. Motion: Painful range of Motion   DVT Exam: No evidence of DVT seen on physical exam.       NVI in lower extremity. Thigh swollen but soft. Moving foot and ankle. Data Review  CBC:   Lab Results   Component Value Date    WBC 10.1 10/21/2020    RBC 4.23 10/21/2020    HGB 12.6 10/21/2020    HCT 36.3 10/21/2020     10/21/2020       Assessment:     Status Post left Total Hip Arthroplasty. Doing well postoperatively. Plan:      1: Continues current post-op course : Continue current plan of care. Medicine following. Post-op labs reviewed and stable. Syncopal episode x2 yesterday, pt feeling improved today.  Block wearing off, quad function returning. 2:  Continue Deep venous thrombosis prophylaxis - Eliquis  3:  Continue physical therapy, OT, 50% WB  4:  Continue Pain Control  5:  Plan for home with [facility] at discharge. Discharge pending progress with therapy and ambulation with no syncopal episodes. Scripts previously sent to meds to beds. Follow up with Dr. Arnulfo Mariee in 2 weeks.     Kristal Guido, CNP

## 2020-10-22 ENCOUNTER — TELEPHONE (OUTPATIENT)
Dept: ORTHOPEDIC SURGERY | Age: 45
End: 2020-10-22

## 2020-10-22 NOTE — TELEPHONE ENCOUNTER
Spoke with pt, doing well. Sore but getting up and moving. Incision status: No drainage or redness    Edema/Swelling/Teds: Not really swelling. Pain level and status: Managing well. Use of pain medications: Using as needed. Blood thinner: Eliquis no issues. Bowels: Moving fine. Home Care Agency active: [facility] active with RN and PT. Wife is OT.      Outpatient therapy: NA    Do you have all of your medications: yes    Changes in medications: no    Ortho Vitals: NA    Follow up appointments:    Future Appointments   Date Time Provider Usama Yu   11/3/2020  9:30 AM NORM Nelson AND EM

## 2020-10-28 ENCOUNTER — TELEPHONE (OUTPATIENT)
Dept: ORTHOPEDIC SURGERY | Age: 45
End: 2020-10-28

## 2020-10-28 NOTE — TELEPHONE ENCOUNTER
Spoke with doing really well, getting in and out of bed no issues. Incision status: No drainage or redness    Edema/Swelling/Teds: Not really swelling. Pain level and status: Managing really well. Use of pain medications: Not needed anymore. Blood thinner: Eliquis with no issues. Bowels: Moving fine. Home Care Agency active: Going fine, wife is an OT. [place] Outpatient therapy: NA    Do you have all of your medications: Yes    Changes in medications: no    Ortho Vitals: NA    Signed off from pt. Instructed pt to call RN or [facility] office with any issues or concerns.         Follow up appointments:    Future Appointments   Date Time Provider Usama Yu   11/3/2020  9:30 AM NORM Sharma AND [facility]

## 2020-11-03 ENCOUNTER — OFFICE VISIT (OUTPATIENT)
Dept: ORTHOPEDIC SURGERY | Age: 45
End: 2020-11-03

## 2020-11-03 VITALS — BODY MASS INDEX: 30.62 KG/M2 | HEIGHT: 68 IN | WEIGHT: 202 LBS

## 2020-11-03 PROCEDURE — 99024 POSTOP FOLLOW-UP VISIT: CPT | Performed by: PHYSICIAN ASSISTANT

## 2020-11-03 NOTE — PROGRESS NOTES
Chief Complaint   Patient presents with    Post-Op Check     PO LT THR SX:10/20/2020     History of present illness: The patient returns today after left total hip replacement performed on 10/20/2020. Is not taking any narcotic pain medication and voices no complaints of pain. Physical examination: The incision is clean, dry, and intact with no drainage or signs of infection. There is expected swelling with no evidence of DVT and a negative Homans sign. Neurovascular exam is intact. Leg length is appropriate. Radiographs: 2 view X-rays taken today including AP pelvis and lateral views of the left hip show excellent alignment of the prosthesis. No evidence of septic or aseptic loosening or periprosthetic fractures. Assessment/plan: We would like to begin outpatient physical therapy to restore range of motion and strength. The patient was given local wound care instructions. He is doing extremely well postoperatively and is going to focus on mostly at home exercise program with guidance from physical therapy. They will followup in 3-4 weeks for reevaluation. [facility]    This dictation was performed with a verbal recognition program [facility]) and it was checked for errors. It is possible that there are still dictated errors within this office note. If so, please bring any errors to my attention for an addendum. All efforts were made to ensure that this office note is accurate.

## 2020-11-10 ENCOUNTER — HOSPITAL ENCOUNTER (OUTPATIENT)
Dept: PHYSICAL THERAPY | Age: 45
Setting detail: THERAPIES SERIES
Discharge: HOME | End: 2020-11-10
Payer: COMMERCIAL

## 2020-11-10 PROCEDURE — 97112 NEUROMUSCULAR REEDUCATION: CPT

## 2020-11-10 PROCEDURE — 97161 PT EVAL LOW COMPLEX 20 MIN: CPT

## 2020-11-10 PROCEDURE — 97110 THERAPEUTIC EXERCISES: CPT

## 2020-11-10 NOTE — FLOWSHEET NOTE
Provided manual therapy to mobilize LE, proximal hip and/or LS spine soft tissue/joints for the purpose of modulating pain, promoting relaxation, increasing ROM, reducing/eliminating soft tissue swelling/inflammation/restriction, improving soft tissue extensibility and allowing for proper ROM for normal function with self-care, mobility, lifting and ambulation. Modalities:     [] GAME READY (VASO)- for significant edema, swelling, pain control. Charges:  Timed Code Treatment Minutes: 23   Total Treatment Minutes: 45      [x] EVAL (LOW) 16831 (typically 20 minutes face-to-face)  [] EVAL (MOD) 85997 (typically 30 minutes face-to-face)  [] EVAL (HIGH) 20470 (typically 45 minutes face-to-face)  [] RE-EVAL     [x] TR(25219) x     [] IONTO  [x] NMR (80647) x     [] VASO  [] Manual (04949) x     [] Other:  [] TA x      [] Mech Traction (29764)  [] ES(attended) (67738)      [] ES (un) (24137):    GOALS:     Patient stated goal: Normal tennis, play tennis     Therapist goals for Patient:   Short Term Goals: To be achieved in: 2 weeks  1. Independent in HEP and progression per patient tolerance, in order to prevent re-injury. []? Progressing: []? Met: []? Not Met: []? Adjusted      2. Patient will have a decrease in pain to facilitate improvement in movement, function, and ADLs as indicated by Functional Deficits. []? Progressing: []? Met: []? Not Met: []? Adjusted      Long Term Goals: To be achieved in: 12 weeks  1. Disability index score of 20% or less for the LEFS to assist with reaching prior level of function. []? Progressing: []? Met: []? Not Met: []? Adjusted      2. Patient will demonstrate an increase in Strength to hip IR/ER/abd to 5/5 allow for proper functional mobility as indicated by patients Functional Deficits. []? Progressing: []? Met: []? Not Met: []? Adjusted      4. Patient will return to functional activities without increased symptoms or restriction. []? Progressing: []? Met: []?  Not Met: []? Adjusted      5. Pt will return to light tennis activities.  (patient specific functional goal)    []? Progressing: []? Met: []? Not Met: []? Adjusted        ASSESSMENT:  See eval    Patient received education on their current pathology and how their condition effects them with their functional activities. Patient understood discussion and questions were answered. Patient understands their activity limitations and understands rational for treatment progression. Pt educated on plan of care and HEP, if worsening symptoms to d/c that exercise. Overall Progression Towards Functional goals/ Treatment Progress Update:  [] Patient is progressing as expected towards functional goals listed. [] Progression is slowed due to complexities/Impairments listed. [] Progression has been slowed due to co-morbidities.   [x] Plan just implemented, too soon to assess goals progression <30days   [] Goals require adjustment due to lack of progress  [] Patient is not progressing as expected and requires additional follow up with physician  [] Other    Prognosis for POC: [x] Good [] Fair  [] Poor      Patient requires continued skilled intervention: [x] Yes  [] No    Treatment/Activity Tolerance:  [x] Patient able to complete treatment  [] Patient limited by fatigue  [] Patient limited by pain    [] Patient limited by other medical complications  [] Other:         Return to Play: (if applicable)   []  Stage 1: Intro to Strength   []  Stage 2: Return to Run and Strength   []  Stage 3: Return to Jump and Strength   []  Stage 4: Dynamic Strength and Agility   []  Stage 5: Sport Specific Training     []  Ready to Return to Play, Meets All Above Stages   []  Not Ready for Return to Sports   Comments:                               PLAN: See eval  [] Continue per plan of care [] Alter current plan (see comments above)  [x] Plan of care initiated [] Hold pending MD visit [] Discharge      Electronically signed by:  Elidia Bosch PT    Note: If patient does not return for scheduled/ recommended follow up visits, this note will serve as a discharge from care along with most recent update on progress.

## 2020-11-10 NOTE — PLAN OF CARE
[address] [facility], [facility], [address] [facility] [address]  Phone: [phone]   Fax:     [phone]       Physical Therapy Certification    Dear Referring Practitioner: NORM Amaro,    We had the pleasure of evaluating the following patient for physical therapy services at [address] [place] [address]. A summary of our findings can be found in the initial assessment below. This includes our plan of care. If you have any questions or concerns regarding these findings, please do not hesitate to contact me at the office phone number checked above. Thank you for the referral.       Physician Signature:_______________________________Date:__________________  By signing above (or electronic signature), therapists plan is approved by physician      Patient: Gina Lynos   : 1975   MRN: [id number]  Referring Physician: Referring Practitioner: NORM Amaro      Evaluation Date: 11/10/2020      Medical Diagnosis Information:  Diagnosis: E27.376 (ICD-10-CM) - Status post total hip replacement, left   Treatment Diagnosis: Left hip pain                                         Insurance information: PT Insurance Information: [employer] $30 CP, 20 PCY     Precautions/ Contra-indications: Anterolateral hip preacuations    Latex Allergy:  [x]NO      []YES    Preferred Language for Healthcare:   [x]English       []other:    SUBJECTIVE: Patient states had hip replacement on 10/20. No current issues,, Has been doing home therapy    Relevant Medical History: None    Pain Scale: Current: 0/10; Max 2/10;  Best 0/10    Easing factors: Rest    Provocative factors: Abduction     Type: []Constant   [x]Intermittent  []Radiating []Localized []other:     Numbness/Tingling: None    Occupation/School:     Living Status/Prior Level of Function: Independent with ADLs and IADLs.    C-SSRS Triggered by Intake questionnaire (Past 2 wk assessment):   [x] No, Questionnaire did not trigger screening.   [] Yes, Patient intake triggered further evaluation      [] C-SSRS Screening completed  [] PCP notified via Plan of Care  [] Emergency services notified     OBJECTIVE:     ROM RIGHT LEFT   Hip Flexion  115   Hip Abduction  40        Popliteal angle 155 155   Rectus femoris          Strength  RIGHT LEFT   Hip Flexors  5   Hip Abductors  5   Hip Extension  5   Hip External Rotation  4   Hip Internal Rotation  4        Knee Extension  5   Knee Flexion  5          Reflexes/Sensation:    [x]Dermatomes/Myotomes intact    []Reflexes equal and normal bilaterally   []Other:    Joint mobility:    []Normal    [x]Hypo   []Hyper    Palpation: NT    Functional Mobility/Transfers: Independent    Posture: WFL    Bandages/Dressings/Incisions: Healing well    Gait: (include devices/WB status) Using SPc, mild gait deviation    Orthopedic Special Tests: None                       [x] Patient history, allergies, meds reviewed. Medical chart reviewed. See intake form. Review Of Systems (ROS):  [x]Performed Review of systems (Integumentary, CardioPulmonary, Neurological) by intake and observation. Intake form has been scanned into medical record. Patient has been instructed to contact their primary care physician regarding ROS issues if not already being addressed at this time.       Co-morbidities/Complexities (which will affect course of rehabilitation):   [x]None           Arthritic conditions   []Rheumatoid arthritis (M05.9)  []Osteoarthritis (M19.91)   Cardiovascular conditions   []Hypertension (I10)  []Hyperlipidemia (E78.5)  []Angina pectoris (I20)  []Atherosclerosis (I70)   Musculoskeletal conditions   []Disc pathology   []Congenital spine pathologies   []Prior surgical intervention  []Osteoporosis (M81.8)  []Osteopenia (M85.8)   Endocrine conditions   []Hypothyroid (E03.9)  []Hyperthyroid Gastrointestinal conditions   []Constipation (L65.25)   Metabolic conditions   []Morbid obesity (E66.01)  []Diabetes type 1(E10.65) or 2 (E11.65)   []Neuropathy (G60.9)     Pulmonary conditions   []Asthma (J45)  []Coughing   []COPD (J44.9)   Psychological Disorders  []Anxiety (F41.9)  []Depression (F32.9)   []Other:   []Other:          Barriers to/and or personal factors that will affect rehab potential:              []Age  []Sex              []Motivation/Lack of Motivation                        []Co-Morbidities              []Cognitive Function, education/learning barriers              []Environmental, home barriers              []profession/work barriers  []past PT/medical experience  []other:  Justification:  None    Falls Risk Assessment (30 days):   [x] Falls Risk assessed and no intervention required. [] Falls Risk assessed and Patient requires intervention due to being higher risk   TUG score (>12s at risk):     [] Falls education provided, including           Functional Questionnaire: LEFS 43%        ASSESSMENT: Stefan Argueta is a 53 y.o. male reporting to OP PT s/p left JODIE with anterolateral apporach on 10/20/20. Pt is noted to have good ROM at this stage. Relatively good strength however rotational strength is limited.          Functional Impairments:     []Noted lumbar/proximal hip/LE joint hypomobility   [x]Decreased LE functional ROM   [x]Decreased core/proximal hip strength and neuromuscular control   [x]Decreased LE functional strength   [x]Reduced balance/proprioceptive control   []other:      Functional Activity Limitations (from functional questionnaire and intake)   []Reduced ability to tolerate prolonged functional positions   []Reduced ability or difficulty with changes of positions or transfers between positions   [x]Reduced ability to maintain good posture and demonstrate good body mechanics with sitting, bending, and lifting   []Reduced ability to sleep   [] Reduced ability or tolerance with driving and/or computer work   []Reduced ability to perform lifting, carrying tasks   [x]Reduced ability to squat   []Reduced ability to forward bend   [x]Reduced ability to ambulate prolonged functional periods/distances/surfaces   [x]Reduced ability to ascend/descend stairs   []Reduced ability to run, hop, cut or jump   []other:    Participation Restrictions   []Reduced participation in self care activities   []Reduced participation in home management activities   []Reduced participation in work activities   [x]Reduced participation in social activities. [x]Reduced participation in sport/recreation activities. Classification :    [x]Signs/symptoms consistent with post-surgical status including decreased ROM, strength and function.    []Signs/symptoms consistent with joint sprain/strain   []Signs/symptoms consistent with patella-femoral syndrome   []Signs/symptoms consistent with knee OA/hip OA   []Signs/symptoms consistent with internal derangement of knee/Hip   []Signs/symptoms consistent with functional hip weakness/NMR control      []Signs/symptoms consistent with tendinitis/tendinosis    []signs/symptoms consistent with pathology which may benefit from Dry needling      []other:      Prognosis/Rehab Potential:      []Excellent   [x]Good    []Fair   []Poor    Tolerance of evaluation/treatment:    []Excellent   [x]Good    []Fair   []Poor    Physical Therapy Evaluation Complexity Justification  [x] A history of present problem with:  [x] no personal factors and/or comorbidities that impact the plan of care;  []1-2 personal factors and/or comorbidities that impact the plan of care  []3 personal factors and/or comorbidities that impact the plan of care  [x] An examination of body systems using standardized tests and measures addressing any of the following: body structures and functions (impairments), activity limitations, and/or participation restrictions;:  [x] a total of 1-2 or more elements   [] a total of 3 or an increase in Strength to hip IR/ER/abd to 5/5 allow for proper functional mobility as indicated by patients Functional Deficits. [] Progressing: [] Met: [] Not Met: [] Adjusted     4. Patient will return to functional activities without increased symptoms or restriction. [] Progressing: [] Met: [] Not Met: [] Adjusted     5.  Pt will return to light tennis activities.  (patient specific functional goal)    [] Progressing: [] Met: [] Not Met: [] Adjusted      Electronically signed by:  Sparkle Torres PT

## 2020-12-02 ENCOUNTER — OFFICE VISIT (OUTPATIENT)
Dept: ORTHOPEDIC SURGERY | Age: 45
End: 2020-12-02

## 2020-12-02 VITALS — HEIGHT: 68 IN | BODY MASS INDEX: 30.62 KG/M2 | WEIGHT: 202 LBS

## 2020-12-02 PROCEDURE — 99024 POSTOP FOLLOW-UP VISIT: CPT | Performed by: ORTHOPAEDIC SURGERY

## 2020-12-02 NOTE — PROGRESS NOTES
This gentleman presents today after his 10/20/2020 left hip replacement. Really voices no complaints whatsoever. Excellent range of motion. No gait is normal.  No signs of infection or DVT. Overall, the patient is doing excellent. Is going continue his activities and return in a year with x-rays. I have personally performed and/or participated in the history, exam and medical decision making and agree with all pertinent clinical information. I have also reviewed and agree with the past medical, family and social history unless otherwise noted. This dictation was performed with a verbal recognition program (DRAGON) and it was checked for errors. It is possible that there are still dictated errors within this office note. If so, please bring any errors to my attention for an addendum. All efforts were made to ensure that this office note is accurate.           Glory Cifuentes MD

## 2023-05-14 NOTE — ANESTHESIA POSTPROCEDURE EVALUATION
Department of Anesthesiology  Postprocedure Note    Patient: Long Campbell  MRN: [id number]  YOB: 1975  Date of evaluation: 10/20/2020  Time:  10:18 AM     Procedure Summary     Date:  10/20/20 Room / Location:  [facility] [address] [place] [address]    Anesthesia Start:  0391 Anesthesia Stop:  0908    Procedure:  LEFT TOTAL HIP REPLACEMENT WITH Clary Contreras & NEPHEW (Left Hip) Diagnosis:       Primary osteoarthritis of left hip      (OSTEOARTHRITIS LEFT HIP)    Surgeon:  Kailey Petersen MD Responsible Provider:  Salvador Ordoñez MD    Anesthesia Type:  general ASA Status:  2          Anesthesia Type: general    William Phase I: William Score: 9    William Phase II:      Last vitals: Reviewed and per EMR flowsheets.      Vitals:    10/20/20 0935 10/20/20 0940 10/20/20 0945 10/20/20 0950   BP: (!) 88/57 106/68 107/67 (!) 105/59   Pulse: 74 64 75 69   Resp:       Temp:       TempSrc:       SpO2: 99% 100% 99% 96%   Weight:       Height:         Anesthesia Post Evaluation    Patient location during evaluation: bedside  Patient participation: complete - patient participated  Level of consciousness: awake and alert  Airway patency: patent  Nausea & Vomiting: no nausea  Complications: no  Cardiovascular status: hemodynamically stable  Respiratory status: acceptable  Hydration status: euvolemic Patient presents to ER C/O urinary symptoms X 2 days, reports chilss, no fever, no N/V, patient reports pain to left leg.

## (undated) DEVICE — SMARTGOWN SURGICAL GOWN, XL: Brand: CONVERTORS

## (undated) DEVICE — SUTURE VCRL + SZ 2-0 L18IN ABSRB UD CT1 L36MM 1/2 CIR VCP839D

## (undated) DEVICE — SUTURE VCRL + SZ 0 L18IN ABSRB UD L36MM CT-1 1/2 CIR VCP840D

## (undated) DEVICE — STERILE POLYISOPRENE POWDER-FREE SURGICAL GLOVES: Brand: PROTEXIS

## (undated) DEVICE — GLOVE SURG SZ 65 THK91MIL LTX FREE SYN POLYISOPRENE

## (undated) DEVICE — DRESSING FOAM W4XL12IN SIL RECT ADH WTRPRF FLM BK W/ BORD

## (undated) DEVICE — SMARTGOWN BREATHABLE SURGICAL GOWN: Brand: CONVERTORS

## (undated) DEVICE — PILLOW POS W15XH6XL22IN RASPBERRY FOAM ABD W/ STRP DISP FOR

## (undated) DEVICE — SOLUTION IV IRRIG POUR BRL 0.9% SODIUM CHL 2F7124

## (undated) DEVICE — SUTURE ETHBND EXCEL SZ 2 L30IN NONABSORBABLE GRN L40MM V-37 MX69G

## (undated) DEVICE — SUTURE SURGLON SZ 1 L18IN NONABSORBABLE BLK GS 21 L37MM 1 2 8886196272

## (undated) DEVICE — TOWEL,OR,DSP,ST,BLUE,STD,6/PK,12PK/CS: Brand: MEDLINE

## (undated) DEVICE — Device

## (undated) DEVICE — 35 ML SYRINGE LUER-LOCK TIP: Brand: MONOJECT

## (undated) DEVICE — 3M™ COBAN™ SELF-ADHERENT WRAP, 1586S, STERILE, 6 IN X 5 YD (15 CM X 4,5 M), 12 ROLLS/CASE: Brand: 3M™ COBAN™

## (undated) DEVICE — SOLUTION IRRIG 2000ML 0.9% SOD CHL USP UROMATIC PLAS CONT

## (undated) DEVICE — COVER,TABLE,HEAVY DUTY,50"X90",STRL: Brand: MEDLINE

## (undated) DEVICE — NEEDLE HYPO 20GA L1.5IN YEL POLYPR HUB S STL REG BVL STR

## (undated) DEVICE — SOLUTION IV IRRIG WATER 1000ML POUR BRL 2F7114

## (undated) DEVICE — SYSTEM SKIN CLSR 22CM DERMBND PRINEO

## (undated) DEVICE — PENCIL SMK EVAC ALL IN 1 DSGN ENH VISIBILITY IMPROVED AIR

## (undated) DEVICE — HOOD, PEEL-AWAY: Brand: FLYTE